# Patient Record
Sex: MALE | Race: WHITE | NOT HISPANIC OR LATINO | Employment: FULL TIME | URBAN - METROPOLITAN AREA
[De-identification: names, ages, dates, MRNs, and addresses within clinical notes are randomized per-mention and may not be internally consistent; named-entity substitution may affect disease eponyms.]

---

## 2019-01-18 ENCOUNTER — OCCUPATIONAL HEALTH (OUTPATIENT)
Dept: URGENT CARE | Facility: CLINIC | Age: 34
End: 2019-01-18
Payer: MEDICAID

## 2019-01-18 DIAGNOSIS — Z00.00 ENCOUNTER FOR PHYSICAL EXAMINATION: ICD-10-CM

## 2019-01-18 PROCEDURE — 72100 XR LUMBAR SPINE 2 OR 3 VIEWS: ICD-10-PCS | Mod: FY,S$GLB,, | Performed by: RADIOLOGY

## 2019-01-18 PROCEDURE — 72100 X-RAY EXAM L-S SPINE 2/3 VWS: CPT | Mod: FY,S$GLB,, | Performed by: RADIOLOGY

## 2019-01-18 PROCEDURE — 99499 PR PHYSICAL - BASIC NON DOT/CDL: ICD-10-PCS | Mod: S$GLB,,, | Performed by: PHYSICIAN ASSISTANT

## 2019-01-18 PROCEDURE — 99499 UNLISTED E&M SERVICE: CPT | Mod: S$GLB,,, | Performed by: PHYSICIAN ASSISTANT

## 2019-02-01 ENCOUNTER — HOSPITAL ENCOUNTER (EMERGENCY)
Facility: HOSPITAL | Age: 34
Discharge: HOME | End: 2019-02-01
Attending: EMERGENCY MEDICINE
Payer: MEDICAID

## 2019-02-01 DIAGNOSIS — S39.012A STRAIN OF LUMBAR REGION, INITIAL ENCOUNTER: Primary | ICD-10-CM

## 2019-02-01 PROCEDURE — 96372 THER/PROPH/DIAG INJ SC/IM: CPT

## 2019-02-01 PROCEDURE — 63600175 PHARM REV CODE 636 W HCPCS: Performed by: EMERGENCY MEDICINE

## 2019-02-01 PROCEDURE — 99284 EMERGENCY DEPT VISIT MOD MDM: CPT | Mod: 25

## 2019-02-01 PROCEDURE — 25000003 PHARM REV CODE 250: Performed by: EMERGENCY MEDICINE

## 2019-02-01 RX ORDER — CYCLOBENZAPRINE HCL 10 MG
10 TABLET ORAL 3 TIMES DAILY PRN
Qty: 15 TABLET | Refills: 0 | Status: SHIPPED | OUTPATIENT
Start: 2019-02-01 | End: 2019-02-06

## 2019-02-01 RX ORDER — KETOROLAC TROMETHAMINE 10 MG/1
10 TABLET, FILM COATED ORAL
Status: COMPLETED | OUTPATIENT
Start: 2019-02-01 | End: 2019-02-01

## 2019-02-01 RX ORDER — TRAMADOL HYDROCHLORIDE 50 MG/1
50 TABLET ORAL EVERY 6 HOURS PRN
Qty: 15 TABLET | Refills: 0 | Status: SHIPPED | OUTPATIENT
Start: 2019-02-01 | End: 2019-02-11

## 2019-02-01 RX ORDER — METHYLPREDNISOLONE SOD SUCC 125 MG
125 VIAL (EA) INJECTION
Status: COMPLETED | OUTPATIENT
Start: 2019-02-01 | End: 2019-02-01

## 2019-02-01 RX ADMIN — METHYLPREDNISOLONE SODIUM SUCCINATE 125 MG: 125 INJECTION, POWDER, FOR SOLUTION INTRAMUSCULAR; INTRAVENOUS at 11:02

## 2019-02-01 RX ADMIN — KETOROLAC TROMETHAMINE 10 MG: 10 TABLET, FILM COATED ORAL at 11:02

## 2019-02-02 VITALS
SYSTOLIC BLOOD PRESSURE: 140 MMHG | RESPIRATION RATE: 18 BRPM | BODY MASS INDEX: 32.98 KG/M2 | WEIGHT: 250 LBS | TEMPERATURE: 96 F | HEART RATE: 69 BPM | OXYGEN SATURATION: 99 % | DIASTOLIC BLOOD PRESSURE: 94 MMHG

## 2019-02-02 NOTE — ED PROVIDER NOTES
Encounter Date: 2/1/2019       History     Chief Complaint   Patient presents with    Back Pain     Patient states a history of lower back pain.  States having MRI done 3 years ago      The history is provided by the patient.   Back Pain    This is a chronic problem. The current episode started several weeks ago. The problem has been waxing and waning. The pain is associated with no known injury. The pain is present in the lumbar spine. The quality of the pain is described as aching. The pain does not radiate. The pain is at a severity of 4/10. The symptoms are aggravated by certain positions. Pertinent negatives include no chest pain, no fever, no numbness, no headaches, no abdominal pain, no abdominal swelling, no dysuria, no leg pain, no paresthesias, no paresis and no tingling. He has tried nothing for the symptoms.     Review of patient's allergies indicates:  No Known Allergies  History reviewed. No pertinent past medical history.  Past Surgical History:   Procedure Laterality Date    HERNIA REPAIR       History reviewed. No pertinent family history.  Social History     Tobacco Use    Smoking status: Current Every Day Smoker     Packs/day: 1.00     Years: 21.00     Pack years: 21.00     Types: Cigarettes    Smokeless tobacco: Never Used   Substance Use Topics    Alcohol use: No    Drug use: No     Review of Systems   Constitutional: Negative for fever.   HENT: Negative for ear discharge and ear pain.    Eyes: Negative for pain and itching.   Respiratory: Negative for cough.    Cardiovascular: Negative for chest pain.   Gastrointestinal: Negative for abdominal pain.   Genitourinary: Negative for dysuria and enuresis.   Musculoskeletal: Positive for back pain. Negative for neck pain and neck stiffness.   Skin: Negative for rash.   Neurological: Negative for tingling, numbness, headaches and paresthesias.       Physical Exam     Initial Vitals [02/01/19 2251]   BP Pulse Resp Temp SpO2   (!) 143/96 72 20 96  °F (35.6 °C) 99 %      MAP       --         Physical Exam    Nursing note and vitals reviewed.  Constitutional: He appears well-developed and well-nourished. He is not diaphoretic. No distress.   HENT:   Head: Normocephalic and atraumatic.   Eyes: EOM are normal. Pupils are equal, round, and reactive to light.   Neck: Normal range of motion. Neck supple. No JVD present.   Pulmonary/Chest: Breath sounds normal. No stridor. No respiratory distress. He has no wheezes. He has no rhonchi. He has no rales.   Abdominal: Soft. Bowel sounds are normal. He exhibits no distension. There is no tenderness. There is no rebound and no guarding.   Musculoskeletal: He exhibits tenderness.        Arms:  Neurological: He is alert and oriented to person, place, and time. No sensory deficit.         ED Course   Procedures  Labs Reviewed - No data to display       Imaging Results    None                               Clinical Impression:   The encounter diagnosis was Strain of lumbar region, initial encounter.      Disposition:   Disposition: Discharged  Condition: Stable                        Ramirez Jorge MD  02/01/19 [id number]

## 2019-02-02 NOTE — ED NOTES
Discharged to home/self care.    - Condition at discharge: Good  - Mode of Discharge: wheelchair  - The patient left the ED by himself  - The discharge instructions were discussed with the patient.  - He stated an understanding of the discharge instructions.  - Walked pt to the discharge station.

## 2019-02-02 NOTE — ED TRIAGE NOTES
33 y.o. male presents to ER ED 03 /ED 03B   Chief Complaint   Patient presents with    Back Pain   Pt reports low back pain for 7 years, reports it is getting worse. No acute distress noted.

## 2020-01-20 ENCOUNTER — OCCUPATIONAL HEALTH (OUTPATIENT)
Dept: URGENT CARE | Facility: CLINIC | Age: 35
End: 2020-01-20

## 2020-01-20 DIAGNOSIS — Z02.89 ENCOUNTER FOR PHYSICAL EXAMINATION RELATED TO EMPLOYMENT: Primary | ICD-10-CM

## 2020-01-20 PROCEDURE — 99499 UNLISTED E&M SERVICE: CPT | Mod: S$GLB,,, | Performed by: NURSE PRACTITIONER

## 2020-01-20 PROCEDURE — 72110 XR LUMBAR SPINE COMPLETE 5 VIEW: ICD-10-PCS | Mod: FY,TIER,S$GLB, | Performed by: RADIOLOGY

## 2020-01-20 PROCEDURE — 71045 X-RAY EXAM CHEST 1 VIEW: CPT | Mod: FY,TIER,S$GLB, | Performed by: RADIOLOGY

## 2020-01-20 PROCEDURE — 99002 DEVICE HANDLING PHYS/QHP: CPT | Mod: S$GLB,,, | Performed by: NURSE PRACTITIONER

## 2020-01-20 PROCEDURE — 99002 N95 MASK FIT: ICD-10-PCS | Mod: S$GLB,,, | Performed by: NURSE PRACTITIONER

## 2020-01-20 PROCEDURE — 99080 OSHA QUESTIONNAIRE: ICD-10-PCS | Mod: S$GLB,,, | Performed by: NURSE PRACTITIONER

## 2020-01-20 PROCEDURE — 72110 X-RAY EXAM L-2 SPINE 4/>VWS: CPT | Mod: FY,TIER,S$GLB, | Performed by: RADIOLOGY

## 2020-01-20 PROCEDURE — 89240 UNLISTED MISC PATH TEST: CPT | Mod: S$GLB,,, | Performed by: NURSE PRACTITIONER

## 2020-01-20 PROCEDURE — 97750 STRENGTH & FLEX: ICD-10-PCS | Mod: S$GLB,,, | Performed by: NURSE PRACTITIONER

## 2020-01-20 PROCEDURE — 97750 PHYSICAL PERFORMANCE TEST: CPT | Mod: S$GLB,,, | Performed by: NURSE PRACTITIONER

## 2020-01-20 PROCEDURE — 99080 SPECIAL REPORTS OR FORMS: CPT | Mod: S$GLB,,, | Performed by: NURSE PRACTITIONER

## 2020-01-20 PROCEDURE — 89240 OOH PANEL 2 (CMP, CBC W/DIFF, UA, MICR): ICD-10-PCS | Mod: S$GLB,,, | Performed by: NURSE PRACTITIONER

## 2020-01-20 PROCEDURE — 94010 PULMONARY FUNCTION SCREENING (OCC MED PHYSICALS): ICD-10-PCS | Mod: S$GLB,,, | Performed by: NURSE PRACTITIONER

## 2020-01-20 PROCEDURE — 99499 PHYSICAL, BASIC COMPLEXITY: ICD-10-PCS | Mod: S$GLB,,, | Performed by: NURSE PRACTITIONER

## 2020-01-20 PROCEDURE — 71045 XR CHEST 1 VIEW: ICD-10-PCS | Mod: FY,TIER,S$GLB, | Performed by: RADIOLOGY

## 2020-01-20 PROCEDURE — 94010 BREATHING CAPACITY TEST: CPT | Mod: S$GLB,,, | Performed by: NURSE PRACTITIONER

## 2020-01-21 LAB
ALBUMIN SERPL-MCNC: 4.7 G/DL (ref 4–5)
ALBUMIN/GLOB SERPL: 2 {RATIO} (ref 1.2–2.2)
ALP SERPL-CCNC: 76 IU/L (ref 39–117)
ALT SERPL-CCNC: 34 IU/L (ref 0–44)
APPEARANCE UR: CLEAR
AST SERPL-CCNC: 20 IU/L (ref 0–40)
BASOPHILS # BLD AUTO: 0 X10E3/UL (ref 0–0.2)
BASOPHILS NFR BLD AUTO: 0 %
BILIRUB SERPL-MCNC: 0.3 MG/DL (ref 0–1.2)
BILIRUB UR QL STRIP: NEGATIVE
BUN SERPL-MCNC: 13 MG/DL (ref 6–20)
BUN/CREAT SERPL: 12 (ref 9–20)
CALCIUM SERPL-MCNC: 9.6 MG/DL (ref 8.7–10.2)
CHLORIDE SERPL-SCNC: 103 MMOL/L (ref 96–106)
CHOLEST SERPL-MCNC: 201 MG/DL (ref 100–199)
CO2 SERPL-SCNC: 22 MMOL/L (ref 20–29)
COLOR UR: YELLOW
CREAT SERPL-MCNC: 1.08 MG/DL (ref 0.76–1.27)
EOSINOPHIL # BLD AUTO: 0.3 X10E3/UL (ref 0–0.4)
EOSINOPHIL NFR BLD AUTO: 3 %
ERYTHROCYTE [DISTWIDTH] IN BLOOD BY AUTOMATED COUNT: 13.8 % (ref 11.6–15.4)
GGT SERPL-CCNC: 47 IU/L (ref 0–65)
GLOBULIN SER CALC-MCNC: 2.4 G/DL (ref 1.5–4.5)
GLUCOSE SERPL-MCNC: 89 MG/DL (ref 65–99)
GLUCOSE UR QL: NEGATIVE
HCT VFR BLD AUTO: 46.7 % (ref 37.5–51)
HDLC SERPL-MCNC: 54 MG/DL
HGB BLD-MCNC: 16.2 G/DL (ref 13–17.7)
HGB UR QL STRIP: NEGATIVE
IMM GRANULOCYTES # BLD AUTO: 0 X10E3/UL (ref 0–0.1)
IMM GRANULOCYTES NFR BLD AUTO: 0 %
KETONES UR QL STRIP: NEGATIVE
LDLC SERPL CALC-MCNC: 107 MG/DL (ref 0–99)
LEUKOCYTE ESTERASE UR QL STRIP: NEGATIVE
LYMPHOCYTES # BLD AUTO: 2.7 X10E3/UL (ref 0.7–3.1)
LYMPHOCYTES NFR BLD AUTO: 28 %
MCH RBC QN AUTO: 30.4 PG (ref 26.6–33)
MCHC RBC AUTO-ENTMCNC: 34.7 G/DL (ref 31.5–35.7)
MCV RBC AUTO: 88 FL (ref 79–97)
MICRO URNS: NORMAL
MONOCYTES # BLD AUTO: 0.6 X10E3/UL (ref 0.1–0.9)
MONOCYTES NFR BLD AUTO: 6 %
NEUTROPHILS # BLD AUTO: 6 X10E3/UL (ref 1.4–7)
NEUTROPHILS NFR BLD AUTO: 63 %
NITRITE UR QL STRIP: NEGATIVE
PH UR STRIP: 5.5 [PH] (ref 5–7.5)
PLATELET # BLD AUTO: 172 X10E3/UL (ref 150–450)
POTASSIUM SERPL-SCNC: 4.2 MMOL/L (ref 3.5–5.2)
PROT SERPL-MCNC: 7.1 G/DL (ref 6–8.5)
PROT UR QL STRIP: NEGATIVE
RBC # BLD AUTO: 5.33 X10E6/UL (ref 4.14–5.8)
SODIUM SERPL-SCNC: 144 MMOL/L (ref 134–144)
SP GR UR: 1.02 (ref 1–1.03)
TRIGL SERPL-MCNC: 201 MG/DL (ref 0–149)
UROBILINOGEN UR STRIP-MCNC: 0.2 MG/DL (ref 0.2–1)
VLDLC SERPL CALC-MCNC: 40 MG/DL (ref 5–40)
WBC # BLD AUTO: 9.6 X10E3/UL (ref 3.4–10.8)

## 2021-08-25 PROBLEM — M51.16 LUMBAR DISC HERNIATION WITH RADICULOPATHY: Status: ACTIVE | Noted: 2021-08-25

## 2021-08-25 PROBLEM — M48.061 LUMBAR FORAMINAL STENOSIS: Status: ACTIVE | Noted: 2021-08-25

## 2021-08-26 PROBLEM — M51.26 LUMBAR DISC HERNIATION: Status: ACTIVE | Noted: 2021-08-26

## 2021-11-29 PROBLEM — M51.36 DEGENERATIVE DISC DISEASE, LUMBAR: Status: ACTIVE | Noted: 2021-11-29

## 2023-02-25 ENCOUNTER — NURSE TRIAGE (OUTPATIENT)
Dept: ADMINISTRATIVE | Facility: CLINIC | Age: 38
End: 2023-02-25
Payer: MEDICAID

## 2023-02-26 NOTE — TELEPHONE ENCOUNTER
Was seen in the ED on yesterday. Was noted to have a blood clot to the back of his leg. Was given a blood thinner and the pain has worsened. Was advised to take blood thinner and to follow up with MD.   Reason for Disposition   Entire foot is cool or blue in comparison to other side    Additional Information   Negative: Looks like a broken bone or dislocated joint (e.g., crooked or deformed)   Negative: Sounds like a life-threatening emergency to the triager   Negative: Followed a leg injury   Negative: Leg swelling is main symptom   Negative: Back pain radiating (shooting) into leg(s)   Negative: Knee pain is main symptom   Negative: Ankle pain is main symptom   Negative: Pregnant   Negative: Postpartum (from 0 to 6 weeks after delivery)   Negative: Chest pain   Negative: Difficulty breathing    Protocols used: Leg Pain-A-AH

## 2023-07-27 ENCOUNTER — HOSPITAL ENCOUNTER (EMERGENCY)
Facility: HOSPITAL | Age: 38
Discharge: LEFT BEFORE DISCHARGE | End: 2023-07-27
Attending: SURGERY
Payer: MEDICAID

## 2023-07-27 VITALS
OXYGEN SATURATION: 95 % | WEIGHT: 300 LBS | TEMPERATURE: 98 F | SYSTOLIC BLOOD PRESSURE: 188 MMHG | DIASTOLIC BLOOD PRESSURE: 95 MMHG | HEIGHT: 73 IN | RESPIRATION RATE: 16 BRPM | HEART RATE: 81 BPM | BODY MASS INDEX: 39.76 KG/M2

## 2023-07-27 DIAGNOSIS — R00.2 PALPITATIONS: ICD-10-CM

## 2023-07-27 DIAGNOSIS — R20.0 LEG NUMBNESS: ICD-10-CM

## 2023-07-27 DIAGNOSIS — Z53.29 LEFT AGAINST MEDICAL ADVICE: Primary | ICD-10-CM

## 2023-07-27 LAB
ALBUMIN SERPL BCP-MCNC: 4.3 G/DL (ref 3.5–5.2)
ALP SERPL-CCNC: 85 U/L (ref 55–135)
ALT SERPL W/O P-5'-P-CCNC: 34 U/L (ref 10–44)
AMPHET+METHAMPHET UR QL: NEGATIVE
ANION GAP SERPL CALC-SCNC: 14 MMOL/L (ref 8–16)
AST SERPL-CCNC: 20 U/L (ref 10–40)
BARBITURATES UR QL SCN>200 NG/ML: NEGATIVE
BASOPHILS # BLD AUTO: 0.08 K/UL (ref 0–0.2)
BASOPHILS NFR BLD: 0.6 % (ref 0–1.9)
BENZODIAZ UR QL SCN>200 NG/ML: NEGATIVE
BILIRUB SERPL-MCNC: 0.5 MG/DL (ref 0.1–1)
BILIRUB UR QL STRIP: NEGATIVE
BNP SERPL-MCNC: 11 PG/ML (ref 0–99)
BUN SERPL-MCNC: 18 MG/DL (ref 6–20)
BZE UR QL SCN: NEGATIVE
CALCIUM SERPL-MCNC: 10 MG/DL (ref 8.7–10.5)
CANNABINOIDS UR QL SCN: ABNORMAL
CHLORIDE SERPL-SCNC: 104 MMOL/L (ref 95–110)
CK SERPL-CCNC: 83 U/L (ref 20–200)
CLARITY UR: CLEAR
CO2 SERPL-SCNC: 20 MMOL/L (ref 23–29)
COLOR UR: YELLOW
CREAT SERPL-MCNC: 1.6 MG/DL (ref 0.5–1.4)
CREAT UR-MCNC: 298.3 MG/DL (ref 23–375)
DIFFERENTIAL METHOD: ABNORMAL
EOSINOPHIL # BLD AUTO: 0.3 K/UL (ref 0–0.5)
EOSINOPHIL NFR BLD: 2.1 % (ref 0–8)
ERYTHROCYTE [DISTWIDTH] IN BLOOD BY AUTOMATED COUNT: 13.5 % (ref 11.5–14.5)
EST. GFR  (NO RACE VARIABLE): 56 ML/MIN/1.73 M^2
GLUCOSE SERPL-MCNC: 153 MG/DL (ref 70–110)
GLUCOSE UR QL STRIP: NEGATIVE
HCT VFR BLD AUTO: 45.6 % (ref 40–54)
HGB BLD-MCNC: 15.3 G/DL (ref 14–18)
HGB UR QL STRIP: NEGATIVE
IMM GRANULOCYTES # BLD AUTO: 0.05 K/UL (ref 0–0.04)
IMM GRANULOCYTES NFR BLD AUTO: 0.4 % (ref 0–0.5)
KETONES UR QL STRIP: NEGATIVE
LEUKOCYTE ESTERASE UR QL STRIP: NEGATIVE
LYMPHOCYTES # BLD AUTO: 4.2 K/UL (ref 1–4.8)
LYMPHOCYTES NFR BLD: 31.2 % (ref 18–48)
MAGNESIUM SERPL-MCNC: 1.9 MG/DL (ref 1.6–2.6)
MCH RBC QN AUTO: 27.9 PG (ref 27–31)
MCHC RBC AUTO-ENTMCNC: 33.6 G/DL (ref 32–36)
MCV RBC AUTO: 83 FL (ref 82–98)
METHADONE UR QL SCN>300 NG/ML: NEGATIVE
MONOCYTES # BLD AUTO: 1.1 K/UL (ref 0.3–1)
MONOCYTES NFR BLD: 8.2 % (ref 4–15)
NEUTROPHILS # BLD AUTO: 7.8 K/UL (ref 1.8–7.7)
NEUTROPHILS NFR BLD: 57.5 % (ref 38–73)
NITRITE UR QL STRIP: NEGATIVE
NRBC BLD-RTO: 0 /100 WBC
OPIATES UR QL SCN: ABNORMAL
PCP UR QL SCN>25 NG/ML: NEGATIVE
PH UR STRIP: 6 [PH] (ref 5–8)
PHOSPHATE SERPL-MCNC: 3 MG/DL (ref 2.7–4.5)
PLATELET # BLD AUTO: 245 K/UL (ref 150–450)
PMV BLD AUTO: 10.2 FL (ref 9.2–12.9)
POTASSIUM SERPL-SCNC: 3.2 MMOL/L (ref 3.5–5.1)
PROT SERPL-MCNC: 7.7 G/DL (ref 6–8.4)
PROT UR QL STRIP: NEGATIVE
RBC # BLD AUTO: 5.48 M/UL (ref 4.6–6.2)
SODIUM SERPL-SCNC: 138 MMOL/L (ref 136–145)
SP GR UR STRIP: >=1.03 (ref 1–1.03)
TOXICOLOGY INFORMATION: ABNORMAL
TROPONIN I SERPL DL<=0.01 NG/ML-MCNC: <0.006 NG/ML (ref 0–0.03)
TSH SERPL DL<=0.005 MIU/L-ACNC: 2.01 UIU/ML (ref 0.4–4)
URN SPEC COLLECT METH UR: ABNORMAL
UROBILINOGEN UR STRIP-ACNC: NEGATIVE EU/DL
WBC # BLD AUTO: 13.51 K/UL (ref 3.9–12.7)

## 2023-07-27 PROCEDURE — 99900031 HC PATIENT EDUCATION (STAT)

## 2023-07-27 PROCEDURE — 93010 EKG 12-LEAD: ICD-10-PCS | Mod: ,,, | Performed by: INTERNAL MEDICINE

## 2023-07-27 PROCEDURE — 82550 ASSAY OF CK (CPK): CPT | Performed by: SURGERY

## 2023-07-27 PROCEDURE — 99285 EMERGENCY DEPT VISIT HI MDM: CPT | Mod: 25

## 2023-07-27 PROCEDURE — 93005 ELECTROCARDIOGRAM TRACING: CPT

## 2023-07-27 PROCEDURE — 80307 DRUG TEST PRSMV CHEM ANLYZR: CPT | Performed by: SURGERY

## 2023-07-27 PROCEDURE — 80053 COMPREHEN METABOLIC PANEL: CPT | Performed by: SURGERY

## 2023-07-27 PROCEDURE — 83880 ASSAY OF NATRIURETIC PEPTIDE: CPT | Performed by: SURGERY

## 2023-07-27 PROCEDURE — 84100 ASSAY OF PHOSPHORUS: CPT | Performed by: SURGERY

## 2023-07-27 PROCEDURE — 99900035 HC TECH TIME PER 15 MIN (STAT)

## 2023-07-27 PROCEDURE — 84484 ASSAY OF TROPONIN QUANT: CPT | Performed by: SURGERY

## 2023-07-27 PROCEDURE — 84443 ASSAY THYROID STIM HORMONE: CPT | Performed by: SURGERY

## 2023-07-27 PROCEDURE — 81003 URINALYSIS AUTO W/O SCOPE: CPT | Mod: 59 | Performed by: SURGERY

## 2023-07-27 PROCEDURE — 36415 COLL VENOUS BLD VENIPUNCTURE: CPT | Performed by: SURGERY

## 2023-07-27 PROCEDURE — 93010 ELECTROCARDIOGRAM REPORT: CPT | Mod: ,,, | Performed by: INTERNAL MEDICINE

## 2023-07-27 PROCEDURE — 85025 COMPLETE CBC W/AUTO DIFF WBC: CPT | Performed by: SURGERY

## 2023-07-27 PROCEDURE — 83735 ASSAY OF MAGNESIUM: CPT | Performed by: SURGERY

## 2023-07-28 NOTE — ED PROVIDER NOTES
"Encounter Date: 7/27/2023       History     Chief Complaint   Patient presents with    Pain     Patient to ER CC of abd pain, numbness and tingling to the right side of his body for about 3 days but getting worse      Hugh Sorenson III is a 38 y.o. male that presents with a large constellation of symptoms  Patient is inconsistent with the story, he states right-sided numbness for the last 3 days now  Patient states this right-sided numbness is associated with lower abdominal pain this week  Patient now stating that the numbness started this afternoon acutely while driving locally  He states this right lower extremity numbness associated with numbness in genital skin  He also states that he is developed a tremor, states both hands are shaking this evening  Patient states these symptoms tonight right-sided then sometimes bilateral in leg areas  He states that he is awaiting back surgery after workman's comp injury several years ago    Review of patient's allergies indicates:  No Known Allergies  Past Medical History:   Diagnosis Date    Anxiety     Back injury     "years ago     Past Surgical History:   Procedure Laterality Date    HERNIA REPAIR      MINIMALLY INVASIVE SURGICAL REMOVAL OF INTERVERTEBRAL DISC OF SPINE USING MICROSCOPE N/A 8/26/2021    Procedure: DISCECTOMY, SPINE, MINIMALLY INVASIVE, USING MICROSCOPE; L4-5; 3hrs duration; C-arm; [facility]; Jelani flat with tabitha frame;;  Surgeon: Sage Burgess MD;  Location: [facility] MAIN OR;  Service: Neurosurgery;  Laterality: N/A;     Family History   Problem Relation Age of Onset    No Known Problems Mother     No Known Problems Father     No Known Problems Sister     No Known Problems Brother     No Known Problems Maternal Aunt     No Known Problems Maternal Uncle     No Known Problems Paternal Aunt     No Known Problems Paternal Uncle     No Known Problems Maternal Grandmother     No Known Problems Maternal Grandfather     No Known Problems Paternal " Grandmother     No Known Problems Paternal Grandfather     Aneurysm Neg Hx     Cancer Neg Hx     Clotting disorder Neg Hx     Dementia Neg Hx     Diabetes Neg Hx     Fainting Neg Hx     Heart disease Neg Hx     Hyperlipidemia Neg Hx     Kidney disease Neg Hx     Liver disease Neg Hx     Migraines Neg Hx     Neuropathy Neg Hx     Obesity Neg Hx     Parkinsonism Neg Hx     Seizures Neg Hx     Stroke Neg Hx     Tremor Neg Hx      Social History     Tobacco Use    Smoking status: Every Day     Packs/day: 1.00     Years: 21.00     Pack years: 21.00     Types: Cigarettes, Vaping with nicotine    Smokeless tobacco: Never   Substance Use Topics    Alcohol use: No    Drug use: No     Review of Systems   Constitutional:  Positive for fatigue. Negative for diaphoresis and fever.   HENT:  Negative for ear pain, hearing loss and tinnitus.    Eyes:  Negative for pain and redness.   Respiratory:  Negative for cough, shortness of breath and wheezing.    Cardiovascular:  Negative for chest pain.   Gastrointestinal:  Positive for nausea. Negative for abdominal pain, constipation, diarrhea and rectal pain.   Musculoskeletal:  Positive for back pain. Negative for neck pain and neck stiffness.   Neurological:  Positive for dizziness, weakness and numbness. Negative for seizures and headaches.   Psychiatric/Behavioral:  Negative for confusion, decreased concentration and dysphoric mood.    All other systems reviewed and are negative.    Physical Exam     Initial Vitals [07/27/23 1912]   BP Pulse Resp Temp SpO2   (!) 188/95 (!) 113 18 97.7 °F (36.5 °C) 99 %      MAP       --         Physical Exam    Nursing note and vitals reviewed.  Constitutional: Vital signs are normal. He appears well-developed and well-nourished. He is cooperative.   HENT:   Head: Normocephalic and atraumatic.   Right Ear: Hearing, tympanic membrane, external ear and ear canal normal.   Left Ear: Hearing, tympanic membrane, external ear and ear canal normal.   Nose:  Nose normal.   Mouth/Throat: Uvula is midline, oropharynx is clear and moist and mucous membranes are normal.   Eyes: Conjunctivae, EOM and lids are normal. Pupils are equal, round, and reactive to light.   Neck: Neck supple. No JVD present.   Normal range of motion.   Full passive range of motion without pain.     Cardiovascular:  Normal rate, regular rhythm, S1 normal, S2 normal, normal heart sounds, intact distal pulses and normal pulses.           Pulmonary/Chest: Effort normal and breath sounds normal.   Abdominal: Abdomen is soft and flat. Bowel sounds are normal.   Musculoskeletal:         General: Normal range of motion.      Cervical back: Full passive range of motion without pain, normal range of motion and neck supple.     Neurological: He is alert and oriented to person, place, and time. He has normal strength.   Skin: Skin is warm, dry and intact. Capillary refill takes less than 2 seconds.       ED Course   Procedures  Labs Reviewed   COMPREHENSIVE METABOLIC PANEL - Abnormal; Notable for the following components:       Result Value    Potassium 3.2 (*)     CO2 20 (*)     Glucose 153 (*)     Creatinine 1.6 (*)     eGFR 56 (*)     All other components within normal limits   CBC W/ AUTO DIFFERENTIAL - Abnormal; Notable for the following components:    WBC 13.51 (*)     Gran # (ANC) 7.8 (*)     Immature Grans (Abs) 0.05 (*)     Mono # 1.1 (*)     All other components within normal limits   URINALYSIS, REFLEX TO URINE CULTURE - Abnormal; Notable for the following components:    Specific Gravity, UA >=1.030 (*)     All other components within normal limits    Narrative:     Specimen Source->Urine   DRUG SCREEN PANEL, URINE EMERGENCY - Abnormal; Notable for the following components:    Opiate Scrn, Ur Presumptive Positive (*)     THC Presumptive Positive (*)     All other components within normal limits    Narrative:     Specimen Source->Urine   TROPONIN I   CK   B-TYPE NATRIURETIC PEPTIDE   TSH   MAGNESIUM    PHOSPHORUS     EKG Readings: (Independently Interpreted)   No STEMI  Normal sinus rhythm  No ectopy  Normal conduction  Normal ST segments  Normal T-wave  Normal axis  Heart rate in the 80s     Imaging Results              X-Ray Chest 1 View (In process)                      CT Lumbar Spine Without Contrast (Final result)  Result time 07/27/23 20:33:11      Final result by Fernandez Frausto MD (07/27/23 20:33:11)                   Impression:      1. No acute abnormality.  2. Multilevel chronic degenerative changes.  3. Nonobstructing nephrolithiasis of the left kidney.      Electronically signed by: Fernandez Frausto  Date:    07/27/2023  Time:    20:33               Narrative:    EXAMINATION:  CT LUMBAR SPINE WITHOUT CONTRAST    CLINICAL HISTORY:  Low back pain, symptoms persist with > 6wks conservative treatment;Low back pain, no red flags, no prior management;Low back pain, increased fracture risk;    TECHNIQUE:  Low-dose axial, sagittal and coronal reformations are obtained through the lumbar spine.  Contrast was not administered.    COMPARISON:  X-ray 11/30/2021    FINDINGS:  No acute fractures of the lumbar spine.  Alignment is satisfactory.    Moderate disc space narrowing at L5-S1.    L1-2: No significant abnormality.    L2-3: No significant abnormality.    L3-4: No significant abnormality.    L4-5: Mild diffuse posterior disc osteophyte complex.  The central canal is adequately maintained.  There is severe bilateral foraminal narrowing left greater than right.    L5-S1: Mild diffuse posterior disc osteophyte complex.  Moderate-severe bilateral foraminal narrowing.  The central canal is adequately maintained.    The remaining visualized paravertebral structures demonstrate no pathology.    Nonobstructing nephrolithiasis of the left kidney is incidentally noted.  An inferior vena caval filter is noted also.                                       CT Head Without Contrast (Final result)  Result time 07/27/23  20:06:35      Final result by Fernandez Frausto MD (07/27/23 20:06:35)                   Impression:      1. No acute intracranial process.  2. Mild bilateral ethmoid sinus disease.      Electronically signed by: Fernandez Frausto  Date:    07/27/2023  Time:    20:06               Narrative:    EXAMINATION:  CT HEAD WITHOUT CONTRAST    CLINICAL HISTORY:  Dizziness, persistent/recurrent, cardiac or vascular cause suspected;    TECHNIQUE:  Low dose axial CT images obtained throughout the head without intravenous contrast. Sagittal and coronal reconstructions were performed.    COMPARISON:  None.    FINDINGS:  Intracranial compartment:    Ventricles and sulci are normal in size for age without evidence of hydrocephalus. No extra-axial blood or fluid collections.    The brain parenchyma appears normal. No parenchymal mass, hemorrhage, edema or major vascular distribution infarct.    Skull/extracranial contents (limited evaluation): No fracture. Mild bilateral ethmoid sinus disease.                                       Medical Decision Making  38-year-old with lower extremity numbness now stating this afternoon  Patient states right greater than left lower extremity numbness on triage  Patient states that he had a lower back injury/workman's comp years ago  He is currently awaiting back surgery & sees local [place] pain MD  He also has a tremor, headache, dizziness, nausea & abdominal pain    Differential Diagnosis  Cauda equina syndrome, Guillain-Powell, chronic low back pain, sciatica  Metabolic encephalopathy, dehydration, fatigue, drug use, anxiety issues  Malingering, somatic pain, stress, viral illness, large differential diagnosis    Problems Addressed:  Left against medical advice: self-limited or minor problem  Leg numbness: complicated acute illness or injury  Palpitations: complicated acute illness or injury    Amount and/or Complexity of Data Reviewed  Independent Historian: spouse  External Data Reviewed:  notes.  Labs: ordered. Decision-making details documented in ED Course.  Radiology: ordered and independent interpretation performed.  ECG/medicine tests: ordered and independent interpretation performed.    ED Management & Risk of Complications, Morbidity, Mortality:  This patient has a large constellation of symptoms on presentation today  Patient states his right leg is numb & that is general areas numb now  He is not being consistent on the timing of this numbness on interview  He initially said 3 days ago now he stating this happened while driving    Patient also states he is nauseated with lower abdominal pain & headache  Pt states he has been dealing with issues since his back injury years ago  Patient is frustrated that he is currently awaiting back surgery in [place]    All imaging in the emergency room was nonacute, nonacute lab work noted  Mild leukocytosis & (+) opiates & marijuana on the drug screen this evening  On my physical exam I see no obvious motor deficits on initial assessment  I plan to transfer this patient to higher level care for neurology evaluation  Pt states he is going to drive himself & signed out against medical advice    Critical Care ED Physician Time (minutes):  -- Performed by: Karri Antoine M.D.  -- Date/Time: 9:36 PM 7/27/2023   -- Direct Patient Care (Face Time): 5  -- Additional History from Records or Taking Additional History: 5  -- Ordering, Reviewing, and Interpreting Diagnostic Studies: 5  -- Total Time in Documentation: 11  -- Consultation with Other Physicians: 5  -- Consultation with Family Related to Condition: 0  -- Total Critical Care Time: 31  -- Critical care was necessary to treat several neurologic conditions  -- Critical care was time spent personally by me on the following activities:   -- development of treatment plan with patient & their family  -- examination of patient, ordering and performing treatments   -- review of radiographic studies, re-evaluation of  pt's condition  -- review of labs and evaluation of response to treatment                                 Clinical Impression:   Final diagnoses:  [R00.2] Palpitations  [R20.0] Leg numbness  [Z53.29] Left against medical advice (Primary)        ED Disposition Condition    AMA Stable                Karri Antoine MD  07/27/23 7790

## 2023-07-28 NOTE — ED NOTES
Pt states he does not want to stay for the testing or treatments. Pt requesting to leave AMA. MD notified. MD discussed risks of leaving AMA with patient. Pt signed AMA form and ambulated from ED and reports that he will drive himself to another facility. LESLEE

## 2023-09-07 ENCOUNTER — HOSPITAL ENCOUNTER (OUTPATIENT)
Facility: HOSPITAL | Age: 38
Discharge: HOME | End: 2023-09-09
Attending: EMERGENCY MEDICINE | Admitting: INTERNAL MEDICINE
Payer: MEDICAID

## 2023-09-07 DIAGNOSIS — R07.9 CHEST PAIN: ICD-10-CM

## 2023-09-07 DIAGNOSIS — M54.50 ACUTE EXACERBATION OF CHRONIC LOW BACK PAIN: Primary | ICD-10-CM

## 2023-09-07 DIAGNOSIS — G89.29 ACUTE EXACERBATION OF CHRONIC LOW BACK PAIN: Primary | ICD-10-CM

## 2023-09-07 DIAGNOSIS — R29.898 WEAKNESS OF BOTH LEGS: ICD-10-CM

## 2023-09-07 PROBLEM — E87.20 METABOLIC ACIDOSIS: Status: ACTIVE | Noted: 2023-09-07

## 2023-09-07 PROBLEM — Z86.718 HISTORY OF DVT (DEEP VEIN THROMBOSIS): Status: ACTIVE | Noted: 2023-09-07

## 2023-09-07 PROBLEM — Z95.828 S/P INSERTION OF IVC (INFERIOR VENA CAVAL) FILTER: Status: ACTIVE | Noted: 2023-09-07

## 2023-09-07 PROBLEM — E66.9 CLASS 2 OBESITY WITH BODY MASS INDEX (BMI) OF 39.0 TO 39.9 IN ADULT: Status: ACTIVE | Noted: 2023-09-07

## 2023-09-07 LAB
ALBUMIN SERPL BCP-MCNC: 4 G/DL (ref 3.5–5.2)
ALP SERPL-CCNC: 76 U/L (ref 55–135)
ALT SERPL W/O P-5'-P-CCNC: 19 U/L (ref 10–44)
ANION GAP SERPL CALC-SCNC: 9 MMOL/L (ref 8–16)
APTT PPP: 22.8 SEC (ref 21–32)
AST SERPL-CCNC: 14 U/L (ref 10–40)
BASOPHILS # BLD AUTO: 0.04 K/UL (ref 0–0.2)
BASOPHILS NFR BLD: 0.4 % (ref 0–1.9)
BILIRUB SERPL-MCNC: 0.4 MG/DL (ref 0.1–1)
BILIRUB UR QL STRIP: NEGATIVE
BUN SERPL-MCNC: 19 MG/DL (ref 6–20)
CALCIUM SERPL-MCNC: 9.5 MG/DL (ref 8.7–10.5)
CHLORIDE SERPL-SCNC: 111 MMOL/L (ref 95–110)
CK SERPL-CCNC: 73 U/L (ref 20–200)
CLARITY UR REFRACT.AUTO: CLEAR
CO2 SERPL-SCNC: 19 MMOL/L (ref 23–29)
COLOR UR AUTO: YELLOW
CREAT SERPL-MCNC: 1.1 MG/DL (ref 0.5–1.4)
DIFFERENTIAL METHOD: ABNORMAL
EOSINOPHIL # BLD AUTO: 0 K/UL (ref 0–0.5)
EOSINOPHIL NFR BLD: 0.2 % (ref 0–8)
ERYTHROCYTE [DISTWIDTH] IN BLOOD BY AUTOMATED COUNT: 13.1 % (ref 11.5–14.5)
EST. GFR  (NO RACE VARIABLE): >60 ML/MIN/1.73 M^2
GLUCOSE SERPL-MCNC: 124 MG/DL (ref 70–110)
GLUCOSE UR QL STRIP: NEGATIVE
HCT VFR BLD AUTO: 44 % (ref 40–54)
HCV AB SERPL QL IA: NORMAL
HGB BLD-MCNC: 14.9 G/DL (ref 14–18)
HGB UR QL STRIP: NEGATIVE
HIV 1+2 AB+HIV1 P24 AG SERPL QL IA: NORMAL
IMM GRANULOCYTES # BLD AUTO: 0.06 K/UL (ref 0–0.04)
IMM GRANULOCYTES NFR BLD AUTO: 0.6 % (ref 0–0.5)
INR PPP: 1 (ref 0.8–1.2)
KETONES UR QL STRIP: NEGATIVE
LEUKOCYTE ESTERASE UR QL STRIP: NEGATIVE
LIPASE SERPL-CCNC: 20 U/L (ref 4–60)
LYMPHOCYTES # BLD AUTO: 1.1 K/UL (ref 1–4.8)
LYMPHOCYTES NFR BLD: 9.9 % (ref 18–48)
MCH RBC QN AUTO: 28.9 PG (ref 27–31)
MCHC RBC AUTO-ENTMCNC: 33.9 G/DL (ref 32–36)
MCV RBC AUTO: 85 FL (ref 82–98)
MONOCYTES # BLD AUTO: 0.1 K/UL (ref 0.3–1)
MONOCYTES NFR BLD: 1.2 % (ref 4–15)
NEUTROPHILS # BLD AUTO: 9.5 K/UL (ref 1.8–7.7)
NEUTROPHILS NFR BLD: 87.7 % (ref 38–73)
NITRITE UR QL STRIP: NEGATIVE
NRBC BLD-RTO: 0 /100 WBC
PH UR STRIP: 5 [PH] (ref 5–8)
PLATELET # BLD AUTO: 200 K/UL (ref 150–450)
PMV BLD AUTO: 11 FL (ref 9.2–12.9)
POCT GLUCOSE: 134 MG/DL (ref 70–110)
POTASSIUM SERPL-SCNC: 4.4 MMOL/L (ref 3.5–5.1)
PROT SERPL-MCNC: 7.2 G/DL (ref 6–8.4)
PROT UR QL STRIP: NEGATIVE
PROTHROMBIN TIME: 11.1 SEC (ref 9–12.5)
RBC # BLD AUTO: 5.15 M/UL (ref 4.6–6.2)
SODIUM SERPL-SCNC: 139 MMOL/L (ref 136–145)
SP GR UR STRIP: >=1.03 (ref 1–1.03)
URN SPEC COLLECT METH UR: ABNORMAL
WBC # BLD AUTO: 10.82 K/UL (ref 3.9–12.7)

## 2023-09-07 PROCEDURE — 25000003 PHARM REV CODE 250

## 2023-09-07 PROCEDURE — 99203 PR OFFICE/OUTPT VISIT, NEW, LEVL III, 30-44 MIN: ICD-10-PCS | Mod: ,,, | Performed by: NEUROLOGICAL SURGERY

## 2023-09-07 PROCEDURE — A9585 GADOBUTROL INJECTION: HCPCS | Performed by: EMERGENCY MEDICINE

## 2023-09-07 PROCEDURE — 99222 1ST HOSP IP/OBS MODERATE 55: CPT | Mod: ,,,

## 2023-09-07 PROCEDURE — 63600175 PHARM REV CODE 636 W HCPCS

## 2023-09-07 PROCEDURE — 25500020 PHARM REV CODE 255: Performed by: EMERGENCY MEDICINE

## 2023-09-07 PROCEDURE — 87389 HIV-1 AG W/HIV-1&-2 AB AG IA: CPT | Performed by: PHYSICIAN ASSISTANT

## 2023-09-07 PROCEDURE — 82550 ASSAY OF CK (CPK): CPT

## 2023-09-07 PROCEDURE — G0378 HOSPITAL OBSERVATION PER HR: HCPCS

## 2023-09-07 PROCEDURE — 85610 PROTHROMBIN TIME: CPT

## 2023-09-07 PROCEDURE — 96372 THER/PROPH/DIAG INJ SC/IM: CPT | Mod: 59

## 2023-09-07 PROCEDURE — 96374 THER/PROPH/DIAG INJ IV PUSH: CPT

## 2023-09-07 PROCEDURE — 99285 EMERGENCY DEPT VISIT HI MDM: CPT | Mod: 25

## 2023-09-07 PROCEDURE — 82962 GLUCOSE BLOOD TEST: CPT

## 2023-09-07 PROCEDURE — 80053 COMPREHEN METABOLIC PANEL: CPT

## 2023-09-07 PROCEDURE — 83690 ASSAY OF LIPASE: CPT

## 2023-09-07 PROCEDURE — 99203 OFFICE O/P NEW LOW 30 MIN: CPT | Mod: ,,, | Performed by: NEUROLOGICAL SURGERY

## 2023-09-07 PROCEDURE — 96376 TX/PRO/DX INJ SAME DRUG ADON: CPT

## 2023-09-07 PROCEDURE — 85730 THROMBOPLASTIN TIME PARTIAL: CPT

## 2023-09-07 PROCEDURE — 81003 URINALYSIS AUTO W/O SCOPE: CPT

## 2023-09-07 PROCEDURE — 85025 COMPLETE CBC W/AUTO DIFF WBC: CPT

## 2023-09-07 PROCEDURE — 96365 THER/PROPH/DIAG IV INF INIT: CPT

## 2023-09-07 PROCEDURE — 99222 PR INITIAL HOSPITAL CARE,LEVL II: ICD-10-PCS | Mod: ,,,

## 2023-09-07 PROCEDURE — 86803 HEPATITIS C AB TEST: CPT | Performed by: PHYSICIAN ASSISTANT

## 2023-09-07 RX ORDER — SIMETHICONE 80 MG
1 TABLET,CHEWABLE ORAL 4 TIMES DAILY PRN
Status: DISCONTINUED | OUTPATIENT
Start: 2023-09-07 | End: 2023-09-09 | Stop reason: HOSPADM

## 2023-09-07 RX ORDER — ACETAMINOPHEN 325 MG/1
650 TABLET ORAL EVERY 8 HOURS PRN
Status: DISCONTINUED | OUTPATIENT
Start: 2023-09-07 | End: 2023-09-09 | Stop reason: HOSPADM

## 2023-09-07 RX ORDER — ACETAMINOPHEN 500 MG
1000 TABLET ORAL 3 TIMES DAILY
Status: DISCONTINUED | OUTPATIENT
Start: 2023-09-07 | End: 2023-09-09 | Stop reason: HOSPADM

## 2023-09-07 RX ORDER — ONDANSETRON 8 MG/1
8 TABLET, ORALLY DISINTEGRATING ORAL EVERY 8 HOURS PRN
Status: DISCONTINUED | OUTPATIENT
Start: 2023-09-07 | End: 2023-09-09 | Stop reason: HOSPADM

## 2023-09-07 RX ORDER — MORPHINE SULFATE 4 MG/ML
4 INJECTION, SOLUTION INTRAMUSCULAR; INTRAVENOUS
Status: COMPLETED | OUTPATIENT
Start: 2023-09-07 | End: 2023-09-07

## 2023-09-07 RX ORDER — POLYETHYLENE GLYCOL 3350 17 G/17G
17 POWDER, FOR SOLUTION ORAL 2 TIMES DAILY PRN
Status: DISCONTINUED | OUTPATIENT
Start: 2023-09-07 | End: 2023-09-09 | Stop reason: HOSPADM

## 2023-09-07 RX ORDER — SODIUM CHLORIDE 0.9 % (FLUSH) 0.9 %
10 SYRINGE (ML) INJECTION
Status: DISCONTINUED | OUTPATIENT
Start: 2023-09-07 | End: 2023-09-09 | Stop reason: HOSPADM

## 2023-09-07 RX ORDER — HYDROMORPHONE HYDROCHLORIDE 1 MG/ML
2 INJECTION, SOLUTION INTRAMUSCULAR; INTRAVENOUS; SUBCUTANEOUS EVERY 6 HOURS PRN
Status: DISCONTINUED | OUTPATIENT
Start: 2023-09-07 | End: 2023-09-09 | Stop reason: HOSPADM

## 2023-09-07 RX ORDER — GABAPENTIN 300 MG/1
300 CAPSULE ORAL 3 TIMES DAILY
Status: DISCONTINUED | OUTPATIENT
Start: 2023-09-07 | End: 2023-09-09 | Stop reason: HOSPADM

## 2023-09-07 RX ORDER — GADOBUTROL 604.72 MG/ML
10 INJECTION INTRAVENOUS
Status: COMPLETED | OUTPATIENT
Start: 2023-09-07 | End: 2023-09-07

## 2023-09-07 RX ORDER — ENOXAPARIN SODIUM 100 MG/ML
40 INJECTION SUBCUTANEOUS EVERY 24 HOURS
Status: DISCONTINUED | OUTPATIENT
Start: 2023-09-07 | End: 2023-09-09 | Stop reason: HOSPADM

## 2023-09-07 RX ORDER — OXYCODONE HYDROCHLORIDE 5 MG/1
30 TABLET ORAL EVERY 6 HOURS PRN
Status: DISCONTINUED | OUTPATIENT
Start: 2023-09-07 | End: 2023-09-07

## 2023-09-07 RX ORDER — OXYCODONE HYDROCHLORIDE 10 MG/1
20 TABLET ORAL EVERY 6 HOURS PRN
Status: DISCONTINUED | OUTPATIENT
Start: 2023-09-07 | End: 2023-09-09 | Stop reason: HOSPADM

## 2023-09-07 RX ORDER — MAG HYDROX/ALUMINUM HYD/SIMETH 200-200-20
30 SUSPENSION, ORAL (FINAL DOSE FORM) ORAL 4 TIMES DAILY PRN
Status: DISCONTINUED | OUTPATIENT
Start: 2023-09-07 | End: 2023-09-09 | Stop reason: HOSPADM

## 2023-09-07 RX ORDER — IBUPROFEN 200 MG
24 TABLET ORAL
Status: DISCONTINUED | OUTPATIENT
Start: 2023-09-07 | End: 2023-09-09 | Stop reason: HOSPADM

## 2023-09-07 RX ORDER — LIDOCAINE 50 MG/G
1 PATCH TOPICAL
Status: DISCONTINUED | OUTPATIENT
Start: 2023-09-07 | End: 2023-09-09 | Stop reason: HOSPADM

## 2023-09-07 RX ORDER — ASPIRIN 81 MG/1
81 TABLET ORAL DAILY
Status: DISCONTINUED | OUTPATIENT
Start: 2023-09-08 | End: 2023-09-09 | Stop reason: HOSPADM

## 2023-09-07 RX ORDER — NALOXONE HCL 0.4 MG/ML
0.02 VIAL (ML) INJECTION
Status: DISCONTINUED | OUTPATIENT
Start: 2023-09-07 | End: 2023-09-09 | Stop reason: HOSPADM

## 2023-09-07 RX ORDER — METHOCARBAMOL 750 MG/1
750 TABLET, FILM COATED ORAL 4 TIMES DAILY
Status: DISCONTINUED | OUTPATIENT
Start: 2023-09-07 | End: 2023-09-09 | Stop reason: HOSPADM

## 2023-09-07 RX ORDER — IBUPROFEN 200 MG
16 TABLET ORAL
Status: DISCONTINUED | OUTPATIENT
Start: 2023-09-07 | End: 2023-09-09 | Stop reason: HOSPADM

## 2023-09-07 RX ORDER — ASPIRIN 81 MG/1
81 TABLET ORAL DAILY
COMMUNITY

## 2023-09-07 RX ORDER — ACETAMINOPHEN 325 MG/1
650 TABLET ORAL EVERY 4 HOURS PRN
Status: DISCONTINUED | OUTPATIENT
Start: 2023-09-07 | End: 2023-09-09 | Stop reason: HOSPADM

## 2023-09-07 RX ORDER — ONDANSETRON 2 MG/ML
4 INJECTION INTRAMUSCULAR; INTRAVENOUS EVERY 8 HOURS PRN
Status: DISCONTINUED | OUTPATIENT
Start: 2023-09-07 | End: 2023-09-09 | Stop reason: HOSPADM

## 2023-09-07 RX ORDER — TALC
6 POWDER (GRAM) TOPICAL NIGHTLY PRN
Status: DISCONTINUED | OUTPATIENT
Start: 2023-09-07 | End: 2023-09-09 | Stop reason: HOSPADM

## 2023-09-07 RX ORDER — GLUCAGON 1 MG
1 KIT INJECTION
Status: DISCONTINUED | OUTPATIENT
Start: 2023-09-07 | End: 2023-09-09 | Stop reason: HOSPADM

## 2023-09-07 RX ADMIN — MORPHINE SULFATE 4 MG: 4 INJECTION INTRAVENOUS at 11:09

## 2023-09-07 RX ADMIN — LIDOCAINE 1 PATCH: 50 PATCH CUTANEOUS at 03:09

## 2023-09-07 RX ADMIN — ENOXAPARIN SODIUM 40 MG: 40 INJECTION SUBCUTANEOUS at 04:09

## 2023-09-07 RX ADMIN — GADOBUTROL 10 ML: 604.72 INJECTION INTRAVENOUS at 10:09

## 2023-09-07 RX ADMIN — OXYCODONE HYDROCHLORIDE 20 MG: 10 TABLET ORAL at 03:09

## 2023-09-07 RX ADMIN — HYDROMORPHONE HYDROCHLORIDE 2 MG: 1 INJECTION, SOLUTION INTRAMUSCULAR; INTRAVENOUS; SUBCUTANEOUS at 09:09

## 2023-09-07 RX ADMIN — METHOCARBAMOL 750 MG: 750 TABLET ORAL at 04:09

## 2023-09-07 RX ADMIN — MORPHINE SULFATE 4 MG: 4 INJECTION INTRAVENOUS at 07:09

## 2023-09-07 RX ADMIN — ACETAMINOPHEN 1000 MG: 500 TABLET ORAL at 03:09

## 2023-09-07 RX ADMIN — GABAPENTIN 300 MG: 300 CAPSULE ORAL at 09:09

## 2023-09-07 RX ADMIN — METHOCARBAMOL 750 MG: 750 TABLET ORAL at 03:09

## 2023-09-07 RX ADMIN — GABAPENTIN 300 MG: 300 CAPSULE ORAL at 03:09

## 2023-09-07 RX ADMIN — OXYCODONE HYDROCHLORIDE 20 MG: 10 TABLET ORAL at 10:09

## 2023-09-07 RX ADMIN — ACETAMINOPHEN 1000 MG: 500 TABLET ORAL at 09:09

## 2023-09-07 RX ADMIN — IOHEXOL 100 ML: 350 INJECTION, SOLUTION INTRAVENOUS at 01:09

## 2023-09-07 NOTE — SUBJECTIVE & OBJECTIVE
"Past Medical History:   Diagnosis Date    Anxiety     Back injury     "years ago       Past Surgical History:   Procedure Laterality Date    BACK SURGERY      HERNIA REPAIR      MINIMALLY INVASIVE SURGICAL REMOVAL OF INTERVERTEBRAL DISC OF SPINE USING MICROSCOPE N/A 08/26/2021    Procedure: DISCECTOMY, SPINE, MINIMALLY INVASIVE, USING MICROSCOPE; L4-5; 3hrs duration; C-arm; [facility]; Jelani flat with tabitha frame;;  Surgeon: Sage Burgess MD;  Location: [facility] MAIN OR;  Service: Neurosurgery;  Laterality: N/A;       Social History     Socioeconomic History    Marital status:    Tobacco Use    Smoking status: Every Day     Current packs/day: 1.00     Average packs/day: 1 pack/day for 21.0 years (21.0 ttl pk-yrs)     Types: Cigarettes, Vaping with nicotine    Smokeless tobacco: Never   Substance and Sexual Activity    Alcohol use: No    Drug use: No    Sexual activity: Not Currently       Family History   Problem Relation Age of Onset    No Known Problems Mother     No Known Problems Father     No Known Problems Sister     No Known Problems Brother     No Known Problems Maternal Aunt     No Known Problems Maternal Uncle     No Known Problems Paternal Aunt     No Known Problems Paternal Uncle     No Known Problems Maternal Grandmother     No Known Problems Maternal Grandfather     No Known Problems Paternal Grandmother     No Known Problems Paternal Grandfather     Aneurysm Neg Hx     Cancer Neg Hx     Clotting disorder Neg Hx     Dementia Neg Hx     Diabetes Neg Hx     Fainting Neg Hx     Heart disease Neg Hx     Hyperlipidemia Neg Hx     Kidney disease Neg Hx     Liver disease Neg Hx     Migraines Neg Hx     Neuropathy Neg Hx     Obesity Neg Hx     Parkinsonism Neg Hx     Seizures Neg Hx     Stroke Neg Hx     Tremor Neg Hx        Review of patient's allergies indicates:  No Known Allergies      Medications:  Continuous Infusions:  Scheduled Meds:  PRN Meds:     Review of Systems  Objective:     Weight: " 136.1 kg (300 lb)  Body mass index is 39.58 kg/m².  Vital Signs (Most Recent):  Temp: 98 °F (36.7 °C) (09/07/23 1147)  Pulse: 83 (09/07/23 1232)  Resp: 18 (09/07/23 1150)  BP: (!) 102/58 (09/07/23 1232)  SpO2: 95 % (09/07/23 1248) Vital Signs (24h Range):  Temp:  [97.9 °F (36.6 °C)-98.3 °F (36.8 °C)] 98 °F (36.7 °C)  Pulse:  [61-83] 83  Resp:  [15-20] 18  SpO2:  [95 %-98 %] 95 %  BP: (102-129)/(45-79) 102/58     Date 09/07/23 0700 - 09/08/23 0659   Shift 3884-9279 1319-1043 6587-6331 24 Hour Total   INTAKE   Shift Total(mL/kg)       OUTPUT   Urine(mL/kg/hr) 400   400   Shift Total(mL/kg) 400(2.9)   400(2.9)   Weight (kg) 136.1 136.1 136.1 136.1                               Physical Exam         Neurosurgery Physical Exam    Physical Exam:    Constitutional: No distress.     HEENT: atraumatic/normocephalic    Cardiovascular: Regular rhythm.     Pulm: aerating well, saturating well    Abdominal: Soft.     Psych/Behavior: He is alert.     RUE: 5/5 delt, 5/5 bi, 5/5 tri, 5/5 hg, 5/5 io  LUE: 5/5 delt, 5/5 bi, 5/5 tri, 5/5 hg, 5/5 io  RLE: 5/5 hf, 5/5 quad, 5/5 hamstring, 5/5 df, 5/5 pf  LLE: 5/5 hf, 5/5 quad, 5/5 hamstring, 5/5 df, 5/5 pf    Effort limited strength, but can overcome in all muscle groups    SILT    No hoffmans  No clonus  No babinski      Significant Labs:  Recent Labs   Lab 09/07/23  0730   *      K 4.4   *   CO2 19*   BUN 19   CREATININE 1.1   CALCIUM 9.5     Recent Labs   Lab 09/07/23  0730   WBC 10.82   HGB 14.9   HCT 44.0        Recent Labs   Lab 09/07/23  0730   INR 1.0   APTT 22.8     Microbiology Results (last 7 days)       ** No results found for the last 168 hours. **          All pertinent labs from the last 24 hours have been reviewed.    Significant Diagnostics:  I have reviewed all pertinent imaging results/findings within the past 24 hours.

## 2023-09-07 NOTE — ASSESSMENT & PLAN NOTE
S/p insertion of IVC filter   - DVT following lumbar surgery  - completed oral anticoagulant course   - IVC filter placed   - CT AP with contrast negative for thrombosis   - DVT prophylaxis while inpatient

## 2023-09-07 NOTE — ED NOTES
Pt resting in bed with family at the bedside, denies any needs at this time, pt updated on plan of care

## 2023-09-07 NOTE — ASSESSMENT & PLAN NOTE
38 M [employer] work in injury, L4-5 herniated disc, s/p laser disc treatment to lumbar spine in the past year presents with BLE pain and weakness.     MRI L spine reviewed, no significant central canal stenosis. Foramenal narrowing at L4-5 bilaterally.     MRI L spine without findings that would explain constellation of symptoms.   No urgent surgical findings present on MRI L spine  No neurosurgical intervention warranted at this time  No indication for admission to neurosurgical service at this time  Recommend MRI T spine w/o contrast for completion of workup given lower abdomen numbness.

## 2023-09-07 NOTE — CONSULTS
"[facility] - [facility] Dept  Neurosurgery  Consult Note    Subjective:     History of Present Illness: 38 M [employer] work in injury, L4-5 herniated disc, s/p laser disc treatment to lumbar spine in the past year presents with BLE pain and weakness. He reports pain and numbness down the lateral and anterior thighs. He reports legs giving out while trying to walk, but no segmental or lateralizing weakness. He has lower abdominal numbness. He denies saddle anesthesia or bowel bladder changes. She denies upper extremity symptoms.       Post-Op Info:  * No surgery found *         Past Medical History:   Diagnosis Date    Anxiety     Back injury     "years ago       Past Surgical History:   Procedure Laterality Date    BACK SURGERY      HERNIA REPAIR      MINIMALLY INVASIVE SURGICAL REMOVAL OF INTERVERTEBRAL DISC OF SPINE USING MICROSCOPE N/A 08/26/2021    Procedure: DISCECTOMY, SPINE, MINIMALLY INVASIVE, USING MICROSCOPE; L4-5; 3hrs duration; C-arm; [facility]; Jelani flat with tabitha frame;;  Surgeon: Sage Burgess MD;  Location: [facility] MAIN OR;  Service: Neurosurgery;  Laterality: N/A;       Social History     Socioeconomic History    Marital status:    Tobacco Use    Smoking status: Every Day     Current packs/day: 1.00     Average packs/day: 1 pack/day for 21.0 years (21.0 ttl pk-yrs)     Types: Cigarettes, Vaping with nicotine    Smokeless tobacco: Never   Substance and Sexual Activity    Alcohol use: No    Drug use: No    Sexual activity: Not Currently       Family History   Problem Relation Age of Onset    No Known Problems Mother     No Known Problems Father     No Known Problems Sister     No Known Problems Brother     No Known Problems Maternal Aunt     No Known Problems Maternal Uncle     No Known Problems Paternal Aunt     No Known Problems Paternal Uncle     No Known Problems Maternal Grandmother     No Known Problems Maternal Grandfather     No Known Problems Paternal Grandmother  "    No Known Problems Paternal Grandfather     Aneurysm Neg Hx     Cancer Neg Hx     Clotting disorder Neg Hx     Dementia Neg Hx     Diabetes Neg Hx     Fainting Neg Hx     Heart disease Neg Hx     Hyperlipidemia Neg Hx     Kidney disease Neg Hx     Liver disease Neg Hx     Migraines Neg Hx     Neuropathy Neg Hx     Obesity Neg Hx     Parkinsonism Neg Hx     Seizures Neg Hx     Stroke Neg Hx     Tremor Neg Hx        Review of patient's allergies indicates:  No Known Allergies      Medications:  Continuous Infusions:  Scheduled Meds:  PRN Meds:     Review of Systems  Objective:     Weight: 136.1 kg (300 lb)  Body mass index is 39.58 kg/m².  Vital Signs (Most Recent):  Temp: 98 °F (36.7 °C) (09/07/23 1147)  Pulse: 83 (09/07/23 1232)  Resp: 18 (09/07/23 1150)  BP: (!) 102/58 (09/07/23 1232)  SpO2: 95 % (09/07/23 1248) Vital Signs (24h Range):  Temp:  [97.9 °F (36.6 °C)-98.3 °F (36.8 °C)] 98 °F (36.7 °C)  Pulse:  [61-83] 83  Resp:  [15-20] 18  SpO2:  [95 %-98 %] 95 %  BP: (102-129)/(45-79) 102/58     Date 09/07/23 0700 - 09/08/23 0659   Shift 5429-9626 1799-5420 3576-0981 24 Hour Total   INTAKE   Shift Total(mL/kg)       OUTPUT   Urine(mL/kg/hr) 400   400   Shift Total(mL/kg) 400(2.9)   400(2.9)   Weight (kg) 136.1 136.1 136.1 136.1                               Physical Exam         Neurosurgery Physical Exam    Physical Exam:    Constitutional: No distress.     HEENT: atraumatic/normocephalic    Cardiovascular: Regular rhythm.     Pulm: aerating well, saturating well    Abdominal: Soft.     Psych/Behavior: He is alert.     RUE: 5/5 delt, 5/5 bi, 5/5 tri, 5/5 hg, 5/5 io  LUE: 5/5 delt, 5/5 bi, 5/5 tri, 5/5 hg, 5/5 io  RLE: 5/5 hf, 5/5 quad, 5/5 hamstring, 5/5 df, 5/5 pf  LLE: 5/5 hf, 5/5 quad, 5/5 hamstring, 5/5 df, 5/5 pf    Effort limited strength, but can overcome in all muscle groups    SILT    No hoffmans  No clonus  No babinski      Significant Labs:  Recent Labs   Lab 09/07/23  0730   *       K 4.4   *   CO2 19*   BUN 19   CREATININE 1.1   CALCIUM 9.5     Recent Labs   Lab 09/07/23  0730   WBC 10.82   HGB 14.9   HCT 44.0        Recent Labs   Lab 09/07/23  0730   INR 1.0   APTT 22.8     Microbiology Results (last 7 days)       ** No results found for the last 168 hours. **          All pertinent labs from the last 24 hours have been reviewed.    Significant Diagnostics:  I have reviewed all pertinent imaging results/findings within the past 24 hours.    Assessment/Plan:     Leg weakness  38 M [employer] work in injury, L4-5 herniated disc, s/p laser disc treatment to lumbar spine in the past year presents with BLE pain and weakness.     MRI L spine reviewed, no significant central canal stenosis. Foramenal narrowing at L4-5 bilaterally.     MRI L spine without findings that would explain constellation of symptoms.   No urgent surgical findings present on MRI L spine  No neurosurgical intervention warranted at this time  No indication for admission to neurosurgical service at this time  Recommend MRI T spine w/o contrast for completion of workup given lower abdomen numbness.         Dinorah Villarreal MD  Neurosurgery  [facility] Dept

## 2023-09-07 NOTE — PHARMACY MED REC
"Admission Medication History     The home medication history was taken by Tobias Yadav.    You may go to "Admission" then "Reconcile Home Medications" tabs to review and/or act upon these items.     The home medication list has been updated by the Pharmacy department.   Please read ALL comments highlighted in yellow.   Please address this information as you see fit.    Feel free to contact us if you have any questions or require assistance.      The medications listed below were removed from the home medication list. Please reorder if appropriate:  Patient reports no longer taking the following medication(s):  GABAPENTIN 300 MG  NAPROXEN 500 MG    Medications listed below were obtained from: Patient  Current Outpatient Medications on File Prior to Encounter   Medication Sig    aspirin (ECOTRIN) 81 MG EC tablet   Take 81 mg by mouth once daily.    oxyCODONE (ROXICODONE) 20 mg Tab immediate release tablet   Take 1 tablet (20 mg) by mouth four times daily as needed for chronic pain                 Tobias Yadav  EXT [id number]                  .          "

## 2023-09-07 NOTE — ED NOTES
Patient identifiers verified and correct for Hugh Sorenson   LOC: The patient is awake, alert and aware of environment with an appropriate affect, the patient is oriented x 3 and speaking appropriately.   APPEARANCE: Patient appears comfortable and in no acute distress, patient is clean and well groomed.  SKIN: The skin is warm and dry, color consistent with ethnicity, patient has normal skin turgor and moist mucus membranes  MUSCULOSKELETAL: Patient moving all extremities spontaneously, no swelling noted.  RESPIRATORY: Airway is open and patent, respirations are spontaneous, patient has a normal effort and rate, no accessory muscle use noted, O2 sats noted at 96% on room air.  CARDIAC: Patient has a normal rate, no edema noted, capillary refill < 3 seconds.   GASTRO: Soft and non tender to palpation, no distention noted, normoactive bowel sounds present in all four quadrants. Pt states bowel movements have been regular.  : Pt denies any pain or frequency with urination.  NEURO: Pt opens eyes spontaneously, behavior appropriate to situation, follows commands, facial expression symmetrical, bilateral hand grasp equal and even, purposeful motor response noted. Pt c/o weakness, numbness, tingling, and pain to BLE.

## 2023-09-07 NOTE — ASSESSMENT & PLAN NOTE
Degenerative disc disease, lumbar  Lumbar disc herniation  Lumbar disc herniation with radiculopathy  Leg weakness  Acute exacerbation of chronic back pain with bilateral lower extremity weakness and inability to ambulate   - MRI L spine showing Postoperative changes of L4-L5 micro discectomy No recurrent disc herniation. No significant canal stenosis.  There is mild-moderate bilateral foraminal narrowing at L4-5.   - Neurosurgery consulted, appreciate recs   No urgent surgical findings present on MRI L spine  No neurosurgical intervention warranted at this time  No indication for admission to neurosurgical service at this time  Recommend MRI T spine w/o contrast for completion of workup given lower abdomen numbness.  - MM pain regimen   - 1 g tylenol q8h   - 750 robaxin QID    - 300 mg gabapentin TID    - 20 mg oxycodone moderate    - 30 mg oxycodone severe    - 2 mg IV dilaudid breakthrough    - lidocaine patch  - PT/OT consult

## 2023-09-07 NOTE — ED TRIAGE NOTES
"Hugh Sorenson III, a 38 y.o. male presents to the ED w/ complaint of possible thrombus in legs.pt says he had paresthesia in both legs. Pt states he had a disc surgery in January, and had a blood clot afterwards, and was taking eliquis. Pt states that he took last dose of eliquis on Sept 1st. Transfer from [facility].    Triage note:  Chief Complaint   Patient presents with    Transfer     From [facility], sent for NSGY. Had disc surgery and was taken of eliquis at the time. Sent her for suspected thrombus. Received 50mcg of fentanyl with EMS     Review of patient's allergies indicates:  No Known Allergies  Past Medical History:   Diagnosis Date    Anxiety     Back injury     "years ago    Patient identifiers for Hugh Sorenson III checked and correct.    LOC: The patient is awake, alert and aware of environment with an appropriate affect, the patient is oriented x 4 and speaking appropriately.    APPEARANCE: Patient resting comfortably and in no acute distress, patient is clean and well groomed, patient's clothing is properly fastened.    SKIN: The skin is warm and dry, color consistent with ethnicity, patient has normal skin turgor and moist mucus membranes, skin intact, no breakdown or bruising noted.    MUSCULOSKELETAL: Patient not moving all extremities well, had disc surgery in January r/t work injury, no obvious swelling or deformities noted.    RESPIRATORY: Airway is open and patent, respirations are spontaneous and even, patient has a normal effort and rate.    CARDIAC: Patient has a normal rate and rhythm, no periphreal edema noted, capillary refill < 3 seconds.    ABDOMEN: Soft and non tender to palpation, no distention noted. Patient denies any nausea, vomiting, diarrhea, or constipation.     NEUROLOGIC: Eyes open spontaneously, PERRL, behavior appropriate to situation, follows commands, facial expression symmetrical, bilateral hand grasp equal and even, purposeful motor response noted, " normal sensation in all extremities.     HEENT: No abnormalities noted. White sclera and pupils equal round and reactive to light. Denies headache, dizziness.     : Pt voids independently, denies dysuria, hematuria, frequency.

## 2023-09-07 NOTE — ASSESSMENT & PLAN NOTE
Body mass index is 39.58 kg/m². Morbid obesity complicates all aspects of disease management from diagnostic modalities to treatment. Weight loss encouraged and health benefits explained to patient.

## 2023-09-07 NOTE — HPI
38 M [employer] work in injury, L4-5 herniated disc, s/p laser disc treatment to lumbar spine in the past year presents with BLE pain and weakness. He reports pain and numbness down the lateral and anterior thighs. He reports legs giving out while trying to walk, but no segmental or lateralizing weakness. He has lower abdominal numbness. He denies saddle anesthesia or bowel bladder changes. She denies upper extremity symptoms.

## 2023-09-07 NOTE — HPI
Hugh Sorenson III is a 38 y.o. male with chronic lumbar back pain s/p L4-L5 discectomy and prior DVT s/p IVC and completion of OAC being admitted to hospital medicine as a transfer for acute on chronic back pain and lower extremity weakness. States he took a bath then got up to use the restroom when he began to have worsening lumbar back pain and weakness. He was able to get into his car and drive himself to the emergency room. No urinary or fecal incontinence, but does endorse some perianal paraesthesias. He is followed by pain management as an outpatient and is on oxycodone 20mg but states that between their home and the emergency room, his wife misplaced his medications. Denies nausea, vomiting, fever, CP or SOB. He was transferred to [facility] for neurosurgery consulted.     In ED: AFVSS. CBC and CMP grossly unremarkable. UA non-infectious. CT AP without convincing thrombus nor mass identified. MRI lumbar spine showing postoperative changes of L4-L5 micro discectomy. No recurrent disc herniation. No significant canal stenosis. There is mild-moderate bilateral foraminal narrowing at L4-5. Neurosurgery consulted, no indication for surgical intervention. Recommended MRI T spine which is pending. Given 4 mg IV morphine x2 without relief. Admitted to hospital medicine for further management.

## 2023-09-07 NOTE — SUBJECTIVE & OBJECTIVE
"Past Medical History:   Diagnosis Date    Anxiety     Back injury     "years ago       Past Surgical History:   Procedure Laterality Date    BACK SURGERY      HERNIA REPAIR      MINIMALLY INVASIVE SURGICAL REMOVAL OF INTERVERTEBRAL DISC OF SPINE USING MICROSCOPE N/A 08/26/2021    Procedure: DISCECTOMY, SPINE, MINIMALLY INVASIVE, USING MICROSCOPE; L4-5; 3hrs duration; C-arm; [facility]; Jelani flat with tabitha frame;;  Surgeon: Sage Burgess MD;  Location: [facility] MAIN OR;  Service: Neurosurgery;  Laterality: N/A;       Review of patient's allergies indicates:  No Known Allergies    No current facility-administered medications on file prior to encounter.     Current Outpatient Medications on File Prior to Encounter   Medication Sig    aspirin (ECOTRIN) 81 MG EC tablet Take 81 mg by mouth once daily.    oxyCODONE (ROXICODONE) 20 mg Tab immediate release tablet Take 1 tablet (20 mg) by mouth four times daily as needed for chronic pain    [DISCONTINUED] gabapentin (NEURONTIN) 300 MG capsule Take 1 po q hs x 3 nights then 1 po bid x 3 days then, 1 po tid continuously for nerve pain    [DISCONTINUED] naproxen (NAPROSYN) 500 MG tablet Take 1 tablet (500 mg total) by mouth 2 (two) times daily as needed (prn pain).     Family History       Problem Relation (Age of Onset)    No Known Problems Mother, Father, Sister, Brother, Maternal Aunt, Maternal Uncle, Paternal Aunt, Paternal Uncle, Maternal Grandmother, Maternal Grandfather, Paternal Grandmother, Paternal Grandfather          Tobacco Use    Smoking status: Every Day     Current packs/day: 1.00     Average packs/day: 1 pack/day for 21.0 years (21.0 ttl pk-yrs)     Types: Cigarettes, Vaping with nicotine    Smokeless tobacco: Never   Substance and Sexual Activity    Alcohol use: No    Drug use: No    Sexual activity: Not Currently     Review of Systems   Constitutional:  Negative for activity change, chills and fever.   HENT:  Negative for trouble swallowing.    Eyes:  " Negative for photophobia and visual disturbance.   Respiratory:  Negative for chest tightness, shortness of breath and wheezing.    Cardiovascular:  Negative for chest pain, palpitations and leg swelling.   Gastrointestinal:  Negative for abdominal pain, constipation, diarrhea, nausea and vomiting.        Perianal paresthesias    Genitourinary:  Negative for difficulty urinating, dysuria, frequency, hematuria and urgency.   Musculoskeletal:  Positive for back pain. Negative for arthralgias and gait problem.   Skin:  Negative for color change and rash.   Neurological:  Positive for weakness and numbness. Negative for dizziness, syncope, light-headedness and headaches.   Psychiatric/Behavioral:  Negative for agitation and confusion. The patient is not nervous/anxious.      Objective:     Vital Signs (Most Recent):  Temp: 98.1 °F (36.7 °C) (09/07/23 1403)  Pulse: 92 (09/07/23 1403)  Resp: 18 (09/07/23 1534)  BP: 123/69 (09/07/23 1403)  SpO2: 96 % (09/07/23 1403) Vital Signs (24h Range):  Temp:  [97.9 °F (36.6 °C)-98.3 °F (36.8 °C)] 98.1 °F (36.7 °C)  Pulse:  [61-92] 92  Resp:  [15-20] 18  SpO2:  [95 %-98 %] 96 %  BP: (102-129)/(45-80) 123/69     Weight: 136.1 kg (300 lb)  Body mass index is 39.58 kg/m².     Physical Exam  Vitals and nursing note reviewed.   Constitutional:       General: He is not in acute distress.     Appearance: He is well-developed.   HENT:      Head: Normocephalic and atraumatic.      Mouth/Throat:      Pharynx: No oropharyngeal exudate.   Eyes:      Conjunctiva/sclera: Conjunctivae normal.      Pupils: Pupils are equal, round, and reactive to light.   Cardiovascular:      Rate and Rhythm: Normal rate and regular rhythm.      Heart sounds: Normal heart sounds.   Pulmonary:      Effort: Pulmonary effort is normal. No respiratory distress.      Breath sounds: Normal breath sounds. No wheezing.   Abdominal:      General: Bowel sounds are normal. There is no distension.      Palpations: Abdomen is  soft.      Tenderness: There is no abdominal tenderness.   Musculoskeletal:         General: No tenderness. Normal range of motion.      Cervical back: Normal range of motion and neck supple.      Right lower leg: No edema.      Left lower leg: No edema.   Lymphadenopathy:      Cervical: No cervical adenopathy.   Skin:     General: Skin is warm and dry.      Capillary Refill: Capillary refill takes less than 2 seconds.      Findings: No rash.   Neurological:      Mental Status: He is alert and oriented to person, place, and time.      Cranial Nerves: No cranial nerve deficit.      Sensory: No sensory deficit.      Motor: No weakness or atrophy.      Coordination: Coordination normal.      Comments: 5/5 strength in all extremities   Psychiatric:         Behavior: Behavior normal.         Thought Content: Thought content normal.         Judgment: Judgment normal.              CRANIAL NERVES     CN III, IV, VI   Pupils are equal, round, and reactive to light.       Significant Labs: All pertinent labs within the past 24 hours have been reviewed.  CBC:   Recent Labs   Lab 09/07/23  0730   WBC 10.82   HGB 14.9   HCT 44.0        CMP:   Recent Labs   Lab 09/07/23  0730      K 4.4   *   CO2 19*   *   BUN 19   CREATININE 1.1   CALCIUM 9.5   PROT 7.2   ALBUMIN 4.0   BILITOT 0.4   ALKPHOS 76   AST 14   ALT 19   ANIONGAP 9       Significant Imaging: I have reviewed all pertinent imaging results/findings within the past 24 hours.  Imaging Results              CT Abdomen Pelvis With Contrast (Final result)  Result time 09/07/23 13:53:30      Final result by Yahir Pedroza Jr., MD (09/07/23 13:53:30)                   Impression:      3 mm nonobstructing left lower pole renal stone.    Hypodensities in the liver and kidneys too small to characterize.    IVC appears patent.  IVC filter with minimal penetration of the struts.  No convincing thrombus nor mass  identified.    Hepatomegaly.      Electronically signed by: Yahir Pedroza MD  Date:    09/07/2023  Time:    13:53               Narrative:    EXAMINATION:  CT ABDOMEN PELVIS WITH CONTRAST    CLINICAL HISTORY:  Abdominal pain, acute, nonlocalized;    TECHNIQUE:  Low dose axial images, sagittal and coronal reformations were obtained from the lung bases to the pubic symphysis following the IV administration of 100 mL of Omnipaque 350 .  Oral contrast was not given.    COMPARISON:  None.    FINDINGS:  In the chest, no significant pleural or pericardial fluid.  Lungs are well aerated.  No confluent consolidation or mass lesion.    In the abdomen, liver is enlarged.  Few mm hypodensities in the hepatic dome too small to characterize.  No intrahepatic biliary dilatation.  Gallbladder unremarkable.  Pancreas and spleen are normal.  No adrenal masses.  Hypodensity in the kidney too small to characterize.  3 mm nonobstructing left lower pole calculus.  No enhancing renal masses.  No hydronephrosis.    Aorta tapers normally.  IVC filter noted.  No significant para-aortic adenopathy.  IVC and bilateral iliac veins are enhanced and appear patent.  No convincing filling defect to suggest thrombus.  No convincing para-aortic nor pelvic adenopathy.  Contrast in the bladder.  Prostate not enlarged for age.    Evaluation of the bowel demonstrates scattered stool and bowel gas.  No focal dilatation.  Presumed ingested material in the stomach.  Appendix is normal.  Nonenlarged mesenteric nodes present.    Bones are well mineralized.  Alignment is satisfactory.  No convincing lytic nor blastic lesion.                                       MRI Lumbar Spine W WO Cont (Final result)  Result time 09/07/23 10:48:42      Final result by Tatum Dorsey MD (09/07/23 10:48:42)                   Impression:      Postoperative changes of L4-L5 micro discectomy. There is mild adjacent endplate edema suggestive of active degenerative change.   No recurrent disc herniation. No significant canal stenosis.  There is mild-moderate bilateral foraminal narrowing at L4-5.      Electronically signed by: Tatum Dorsey MD  Date:    09/07/2023  Time:    10:48               Narrative:    EXAMINATION:  MRI LUMBAR SPINE W WO CONTRAST    CLINICAL HISTORY:  Low back pain, prior surgery, new symptoms;    TECHNIQUE:  Multiplanar, multisequence images of the Lumbar Spine were obtained with and without the use of intravenous contrast. 10 of IV Gadavist was injected.    COMPARISON:  08/12/2021 MRI lumbar    FINDINGS:  Prior L4-5 micro discectomy (08/26/2021.    Alignment: Normal lumbar lordosis is preserved.    Vertebrae:  Body heights are well maintained. No evidence for acute fracture.  There is mild adjacent endplate edema at L4-5 suggestive of active degenerative change.    Discs:  Mild disc desiccation L3-4 through L5-S1.    Cord:  Visualized conus and lumbar spinal nerve roots demonstrate normal signal.    T12-L1 through L2-3: There is no focal disc herniation.  No significant spinal canal stenosis.  No significant neural foraminal narrowing.    L3-4:  Mild disc bulge, no significant spinal canal stenosis.  No significant neural foraminal narrowing.  The facet joints appear within normal limits.    L4-5:  Previously seen disc herniation is no longer seen.  No recurrent disc herniation.  Very mild foraminal bulge bilaterally.  There is no canal stenosis.  There is mild-moderate foraminal narrowing.    L5-S1:  Mild diffuse disc bulge.  No significant spinal canal stenosis.  No significant neural foraminal narrowing.  Mild facet joint degenerative change.    No enhancing intradural mass or abnormal leptomeningeal enhancement. No intramedullary cord enhancement.  The conus terminates at L1-L2 level.    The upper sacrum and upper sacroiliac joints appear normal.  The paravertebral and paraspinal soft tissues appear normal.                        Final result by  Tatum Dorsey MD (09/07/23 10:48:42)                   Impression:      Postoperative changes of L4-L5 micro discectomy. There is mild adjacent endplate edema suggestive of active degenerative change.  No recurrent disc herniation. No significant canal stenosis.  There is mild-moderate bilateral foraminal narrowing at L4-5.      Electronically signed by: Tatum Dorsey MD  Date:    09/07/2023  Time:    10:48               Narrative:    EXAMINATION:  MRI LUMBAR SPINE W WO CONTRAST    CLINICAL HISTORY:  Low back pain, prior surgery, new symptoms;    TECHNIQUE:  Multiplanar, multisequence images of the Lumbar Spine were obtained with and without the use of intravenous contrast. 10 of IV Gadavist was injected.    COMPARISON:  08/12/2021 MRI lumbar    FINDINGS:  Prior L4-5 micro discectomy (08/26/2021.    Alignment: Normal lumbar lordosis is preserved.    Vertebrae:  Body heights are well maintained. No evidence for acute fracture.  There is mild adjacent endplate edema at L4-5 suggestive of active degenerative change.    Discs:  Mild disc desiccation L3-4 through L5-S1.    Cord:  Visualized conus and lumbar spinal nerve roots demonstrate normal signal.    T12-L1 through L2-3: There is no focal disc herniation.  No significant spinal canal stenosis.  No significant neural foraminal narrowing.    L3-4:  Mild disc bulge, no significant spinal canal stenosis.  No significant neural foraminal narrowing.  The facet joints appear within normal limits.    L4-5:  Previously seen disc herniation is no longer seen.  No recurrent disc herniation.  Very mild foraminal bulge bilaterally.  There is no canal stenosis.  There is mild-moderate foraminal narrowing.    L5-S1:  Mild diffuse disc bulge.  No significant spinal canal stenosis.  No significant neural foraminal narrowing.  Mild facet joint degenerative change.    No enhancing intradural mass or abnormal leptomeningeal enhancement. No intramedullary cord  enhancement.  The conus terminates at L1-L2 level.    The upper sacrum and upper sacroiliac joints appear normal.  The paravertebral and paraspinal soft tissues appear normal.                        Final result by Tatum Dorsey MD (09/07/23 10:48:42)                   Impression:      Postoperative changes of L4-L5 micro discectomy. There is mild adjacent endplate edema suggestive of active degenerative change.  No recurrent disc herniation. No significant canal stenosis.  There is mild-moderate bilateral foraminal narrowing at L4-5.      Electronically signed by: Tatum Dorsey MD  Date:    09/07/2023  Time:    10:48               Narrative:    EXAMINATION:  MRI LUMBAR SPINE W WO CONTRAST    CLINICAL HISTORY:  Low back pain, prior surgery, new symptoms;    TECHNIQUE:  Multiplanar, multisequence images of the Lumbar Spine were obtained with and without the use of intravenous contrast. 10 of IV Gadavist was injected.    COMPARISON:  08/12/2021 MRI lumbar    FINDINGS:  Prior L4-5 micro discectomy (08/26/2021.    Alignment: Normal lumbar lordosis is preserved.    Vertebrae:  Body heights are well maintained. No evidence for acute fracture.  There is mild adjacent endplate edema at L4-5 suggestive of active degenerative change.    Discs:  Mild disc desiccation L3-4 through L5-S1.    Cord:  Visualized conus and lumbar spinal nerve roots demonstrate normal signal.    T12-L1 through L2-3: There is no focal disc herniation.  No significant spinal canal stenosis.  No significant neural foraminal narrowing.    L3-4:  Mild disc bulge, no significant spinal canal stenosis.  No significant neural foraminal narrowing.  The facet joints appear within normal limits.    L4-5:  Previously seen disc herniation is no longer seen.  No recurrent disc herniation.  Very mild foraminal bulge bilaterally.  There is no canal stenosis.  There is mild-moderate foraminal narrowing.    L5-S1:  Mild diffuse disc bulge.  No  significant spinal canal stenosis.  No significant neural foraminal narrowing.  Mild facet joint degenerative change.    No enhancing intradural mass or abnormal leptomeningeal enhancement. No intramedullary cord enhancement.  The conus terminates at L1-L2 level.    The upper sacrum and upper sacroiliac joints appear normal.  The paravertebral and paraspinal soft tissues appear normal.

## 2023-09-07 NOTE — ED PROVIDER NOTES
"Encounter Date: 9/7/2023       History     Chief Complaint   Patient presents with    Transfer     From [facility], sent for NSGY. Had disc surgery and was taken of eliquis at the time. Sent her for suspected thrombus. Received 50mcg of fentanyl with EMS     The history is provided by the patient. No  was used.     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 The symptoms began last night approximately 11/12 last night, when the patient was experiencing pain and wasn't able to sleep. He tried to walk and after 10 seconds, realized he was unable to walk stating it "feels like my legs are falling off." He drove himself to [facility] for evaluation. A CT lumbar spine did not reveal any abnormalities, and it was determined that he needed to be transferred for MRI out of concern for clot.    At their facility: Na 142, K 3.3, Cl 108, CO2 19, Cr 1.33, Ca 9.4, Mg 1.8, WBC 12.3, Hgb 15.2, PT 13.5, INR 1.24, PTT 29.8, HS D-Dimer 553. Neuro exam revealed: 4/5 BLE strength ,3+ patellar reflexes bilaterally. Patient endorses hot/cold sensation changes per their testing. Prior to transfer, patient given Heparin 5000U, Toradol, demerol, decadron.     Review of patient's allergies indicates:  No Known Allergies  Past Medical History:   Diagnosis Date    Anxiety     Back injury     "years ago     Past Surgical History:   Procedure Laterality Date    BACK SURGERY      HERNIA REPAIR      MINIMALLY INVASIVE SURGICAL REMOVAL OF INTERVERTEBRAL DISC OF SPINE USING MICROSCOPE N/A 08/26/2021    Procedure: DISCECTOMY, SPINE, MINIMALLY INVASIVE, USING MICROSCOPE; L4-5; 3hrs duration; C-arm; METRx; Jelani flat with tabitha frame;;  Surgeon: Sage Burgess MD;  Location: [facility] MAIN OR;  Service: Neurosurgery;  Laterality: N/A;     Family History   Problem Relation Age of Onset    No " Known Problems Mother     No Known Problems Father     No Known Problems Sister     No Known Problems Brother     No Known Problems Maternal Aunt     No Known Problems Maternal Uncle     No Known Problems Paternal Aunt     No Known Problems Paternal Uncle     No Known Problems Maternal Grandmother     No Known Problems Maternal Grandfather     No Known Problems Paternal Grandmother     No Known Problems Paternal Grandfather     Aneurysm Neg Hx     Cancer Neg Hx     Clotting disorder Neg Hx     Dementia Neg Hx     Diabetes Neg Hx     Fainting Neg Hx     Heart disease Neg Hx     Hyperlipidemia Neg Hx     Kidney disease Neg Hx     Liver disease Neg Hx     Migraines Neg Hx     Neuropathy Neg Hx     Obesity Neg Hx     Parkinsonism Neg Hx     Seizures Neg Hx     Stroke Neg Hx     Tremor Neg Hx      Social History     Tobacco Use    Smoking status: Every Day     Current packs/day: 1.00     Average packs/day: 1 pack/day for 21.0 years (21.0 ttl pk-yrs)     Types: Cigarettes, Vaping with nicotine    Smokeless tobacco: Never   Substance Use Topics    Alcohol use: No    Drug use: No     Review of Systems   Constitutional:  Negative for fever.   Gastrointestinal:  Positive for abdominal pain.        Negative for Stool incontinence   Genitourinary:         Negative for urinary incontinence   Musculoskeletal:  Positive for back pain.   Neurological:  Positive for weakness and numbness.       Physical Exam     Initial Vitals [09/07/23 0601]   BP Pulse Resp Temp SpO2   129/72 78 20 98.3 °F (36.8 °C) 98 %      MAP       --         Physical Exam    Constitutional: He appears well-developed and well-nourished.   HENT:   Head: Normocephalic and atraumatic.   Eyes: Conjunctivae and EOM are normal.   Cardiovascular:  Normal rate, regular rhythm and normal heart sounds.     Exam reveals no gallop and no friction rub.       No murmur heard.  Pulmonary/Chest: Breath sounds normal. No respiratory distress. He  "has no wheezes. He has no rhonchi.   Abdominal: Abdomen is soft. He exhibits no distension. There is abdominal tenderness (epigastric tenderness to umbilicus; pain distributing laterally at "belt-line").     Neurological: He is alert. He has normal strength. He displays abnormal reflex (patellar reflexes slightly hyperreflexive). A sensory deficit (patient describes numbness sensation with light touch, sometimes able to differentiate b/w dull and sharp for lower extremities) is present.       ED Course   Procedures  Labs Reviewed   COMPREHENSIVE METABOLIC PANEL - Abnormal; Notable for the following components:       Result Value    Chloride 111 (*)     CO2 19 (*)     Glucose 124 (*)     All other components within normal limits   CBC W/ AUTO DIFFERENTIAL - Abnormal; Notable for the following components:    Immature Granulocytes 0.6 (*)     Gran # (ANC) 9.5 (*)     Immature Grans (Abs) 0.06 (*)     Mono # 0.1 (*)     Gran % 87.7 (*)     Lymph % 9.9 (*)     Mono % 1.2 (*)     All other components within normal limits   URINALYSIS, REFLEX TO URINE CULTURE - Abnormal; Notable for the following components:    Specific Gravity, UA >=1.030 (*)     All other components within normal limits    Narrative:     Specimen Source->Urine   HIV 1 / 2 ANTIBODY    Narrative:     Release to patient->Immediate   HEPATITIS C ANTIBODY    Narrative:     Release to patient->Immediate   LIPASE   APTT   PROTIME-INR   CK   CK    Narrative:     Add on CPK per SILVANA ROBERTS Epic order [id number]  10:35  09/07/2023           Imaging Results              CT Abdomen Pelvis With Contrast (Final result)  Result time 09/07/23 13:53:30      Final result by Yahir Pedroza Jr., MD (09/07/23 13:53:30)                   Impression:      3 mm nonobstructing left lower pole renal stone.    Hypodensities in the liver and kidneys too small to characterize.    IVC appears patent.  IVC filter with minimal penetration of the struts.  No convincing thrombus nor " mass identified.    Hepatomegaly.      Electronically signed by: Yahir Pedroza MD  Date:    09/07/2023  Time:    13:53               Narrative:    EXAMINATION:  CT ABDOMEN PELVIS WITH CONTRAST    CLINICAL HISTORY:  Abdominal pain, acute, nonlocalized;    TECHNIQUE:  Low dose axial images, sagittal and coronal reformations were obtained from the lung bases to the pubic symphysis following the IV administration of 100 mL of Omnipaque 350 .  Oral contrast was not given.    COMPARISON:  None.    FINDINGS:  In the chest, no significant pleural or pericardial fluid.  Lungs are well aerated.  No confluent consolidation or mass lesion.    In the abdomen, liver is enlarged.  Few mm hypodensities in the hepatic dome too small to characterize.  No intrahepatic biliary dilatation.  Gallbladder unremarkable.  Pancreas and spleen are normal.  No adrenal masses.  Hypodensity in the kidney too small to characterize.  3 mm nonobstructing left lower pole calculus.  No enhancing renal masses.  No hydronephrosis.    Aorta tapers normally.  IVC filter noted.  No significant para-aortic adenopathy.  IVC and bilateral iliac veins are enhanced and appear patent.  No convincing filling defect to suggest thrombus.  No convincing para-aortic nor pelvic adenopathy.  Contrast in the bladder.  Prostate not enlarged for age.    Evaluation of the bowel demonstrates scattered stool and bowel gas.  No focal dilatation.  Presumed ingested material in the stomach.  Appendix is normal.  Nonenlarged mesenteric nodes present.    Bones are well mineralized.  Alignment is satisfactory.  No convincing lytic nor blastic lesion.                                       MRI Lumbar Spine W WO Cont (Final result)  Result time 09/07/23 10:48:42      Final result by Tatum Dorsey MD (09/07/23 10:48:42)                   Impression:      Postoperative changes of L4-L5 micro discectomy. There is mild adjacent endplate edema suggestive of active degenerative  change.  No recurrent disc herniation. No significant canal stenosis.  There is mild-moderate bilateral foraminal narrowing at L4-5.      Electronically signed by: Tatum Dorsey MD  Date:    09/07/2023  Time:    10:48               Narrative:    EXAMINATION:  MRI LUMBAR SPINE W WO CONTRAST    CLINICAL HISTORY:  Low back pain, prior surgery, new symptoms;    TECHNIQUE:  Multiplanar, multisequence images of the Lumbar Spine were obtained with and without the use of intravenous contrast. 10 of IV Gadavist was injected.    COMPARISON:  08/12/2021 MRI lumbar    FINDINGS:  Prior L4-5 micro discectomy (08/26/2021.    Alignment: Normal lumbar lordosis is preserved.    Vertebrae:  Body heights are well maintained. No evidence for acute fracture.  There is mild adjacent endplate edema at L4-5 suggestive of active degenerative change.    Discs:  Mild disc desiccation L3-4 through L5-S1.    Cord:  Visualized conus and lumbar spinal nerve roots demonstrate normal signal.    T12-L1 through L2-3: There is no focal disc herniation.  No significant spinal canal stenosis.  No significant neural foraminal narrowing.    L3-4:  Mild disc bulge, no significant spinal canal stenosis.  No significant neural foraminal narrowing.  The facet joints appear within normal limits.    L4-5:  Previously seen disc herniation is no longer seen.  No recurrent disc herniation.  Very mild foraminal bulge bilaterally.  There is no canal stenosis.  There is mild-moderate foraminal narrowing.    L5-S1:  Mild diffuse disc bulge.  No significant spinal canal stenosis.  No significant neural foraminal narrowing.  Mild facet joint degenerative change.    No enhancing intradural mass or abnormal leptomeningeal enhancement. No intramedullary cord enhancement.  The conus terminates at L1-L2 level.    The upper sacrum and upper sacroiliac joints appear normal.  The paravertebral and paraspinal soft tissues appear normal.                        Final result  by Tatum Dorsey MD (09/07/23 10:48:42)                   Impression:      Postoperative changes of L4-L5 micro discectomy. There is mild adjacent endplate edema suggestive of active degenerative change.  No recurrent disc herniation. No significant canal stenosis.  There is mild-moderate bilateral foraminal narrowing at L4-5.      Electronically signed by: Tatum Dorsey MD  Date:    09/07/2023  Time:    10:48               Narrative:    EXAMINATION:  MRI LUMBAR SPINE W WO CONTRAST    CLINICAL HISTORY:  Low back pain, prior surgery, new symptoms;    TECHNIQUE:  Multiplanar, multisequence images of the Lumbar Spine were obtained with and without the use of intravenous contrast. 10 of IV Gadavist was injected.    COMPARISON:  08/12/2021 MRI lumbar    FINDINGS:  Prior L4-5 micro discectomy (08/26/2021.    Alignment: Normal lumbar lordosis is preserved.    Vertebrae:  Body heights are well maintained. No evidence for acute fracture.  There is mild adjacent endplate edema at L4-5 suggestive of active degenerative change.    Discs:  Mild disc desiccation L3-4 through L5-S1.    Cord:  Visualized conus and lumbar spinal nerve roots demonstrate normal signal.    T12-L1 through L2-3: There is no focal disc herniation.  No significant spinal canal stenosis.  No significant neural foraminal narrowing.    L3-4:  Mild disc bulge, no significant spinal canal stenosis.  No significant neural foraminal narrowing.  The facet joints appear within normal limits.    L4-5:  Previously seen disc herniation is no longer seen.  No recurrent disc herniation.  Very mild foraminal bulge bilaterally.  There is no canal stenosis.  There is mild-moderate foraminal narrowing.    L5-S1:  Mild diffuse disc bulge.  No significant spinal canal stenosis.  No significant neural foraminal narrowing.  Mild facet joint degenerative change.    No enhancing intradural mass or abnormal leptomeningeal enhancement. No intramedullary cord  enhancement.  The conus terminates at L1-L2 level.    The upper sacrum and upper sacroiliac joints appear normal.  The paravertebral and paraspinal soft tissues appear normal.                        Final result by Tatum Dorsey MD (09/07/23 10:48:42)                   Impression:      Postoperative changes of L4-L5 micro discectomy. There is mild adjacent endplate edema suggestive of active degenerative change.  No recurrent disc herniation. No significant canal stenosis.  There is mild-moderate bilateral foraminal narrowing at L4-5.      Electronically signed by: Tatum Dorsey MD  Date:    09/07/2023  Time:    10:48               Narrative:    EXAMINATION:  MRI LUMBAR SPINE W WO CONTRAST    CLINICAL HISTORY:  Low back pain, prior surgery, new symptoms;    TECHNIQUE:  Multiplanar, multisequence images of the Lumbar Spine were obtained with and without the use of intravenous contrast. 10 of IV Gadavist was injected.    COMPARISON:  08/12/2021 MRI lumbar    FINDINGS:  Prior L4-5 micro discectomy (08/26/2021.    Alignment: Normal lumbar lordosis is preserved.    Vertebrae:  Body heights are well maintained. No evidence for acute fracture.  There is mild adjacent endplate edema at L4-5 suggestive of active degenerative change.    Discs:  Mild disc desiccation L3-4 through L5-S1.    Cord:  Visualized conus and lumbar spinal nerve roots demonstrate normal signal.    T12-L1 through L2-3: There is no focal disc herniation.  No significant spinal canal stenosis.  No significant neural foraminal narrowing.    L3-4:  Mild disc bulge, no significant spinal canal stenosis.  No significant neural foraminal narrowing.  The facet joints appear within normal limits.    L4-5:  Previously seen disc herniation is no longer seen.  No recurrent disc herniation.  Very mild foraminal bulge bilaterally.  There is no canal stenosis.  There is mild-moderate foraminal narrowing.    L5-S1:  Mild diffuse disc bulge.  No  significant spinal canal stenosis.  No significant neural foraminal narrowing.  Mild facet joint degenerative change.    No enhancing intradural mass or abnormal leptomeningeal enhancement. No intramedullary cord enhancement.  The conus terminates at L1-L2 level.    The upper sacrum and upper sacroiliac joints appear normal.  The paravertebral and paraspinal soft tissues appear normal.                                       Medications   sodium chloride 0.9% flush 10 mL (has no administration in time range)   melatonin tablet 6 mg (has no administration in time range)   ondansetron disintegrating tablet 8 mg (has no administration in time range)   ondansetron injection 4 mg (has no administration in time range)   polyethylene glycol packet 17 g (has no administration in time range)   acetaminophen tablet 650 mg (has no administration in time range)   simethicone chewable tablet 80 mg (has no administration in time range)   aluminum-magnesium hydroxide-simethicone 200-200-20 mg/5 mL suspension 30 mL (has no administration in time range)   acetaminophen tablet 650 mg (has no administration in time range)   naloxone 0.4 mg/mL injection 0.02 mg (has no administration in time range)   glucose chewable tablet 16 g (has no administration in time range)   glucose chewable tablet 24 g (has no administration in time range)   glucagon (human recombinant) injection 1 mg (has no administration in time range)   enoxaparin injection 40 mg (has no administration in time range)   oxyCODONE immediate release tablet 20 mg (has no administration in time range)   oxyCODONE immediate release tablet 30 mg (has no administration in time range)   methocarbamoL tablet 750 mg (750 mg Oral Given 9/7/23 1526)   acetaminophen tablet 1,000 mg (1,000 mg Oral Given 9/7/23 1526)   LIDOcaine 5 % patch 1 patch (1 patch Transdermal Patch Applied 9/7/23 1527)   gabapentin capsule 300 mg (300 mg Oral Given 9/7/23 1526)   dextrose 10% bolus 125 mL 125 mL  (has no administration in time range)   dextrose 10% bolus 250 mL 250 mL (has no administration in time range)   HYDROmorphone injection 2 mg (has no administration in time range)   morphine injection 4 mg (4 mg Intravenous Given 9/7/23 0733)   gadobutroL (GADAVIST) injection 10 mL (10 mLs Intravenous Given 9/7/23 1024)   morphine injection 4 mg (4 mg Intravenous Given 9/7/23 1150)   iohexoL (OMNIPAQUE 350) injection 100 mL (100 mLs Intravenous Given 9/7/23 1338)     Medical Decision Making  Patient is a 39yo male with hx of DLDR L3-S1 in January with IVC filter d/t DVT, just finished Eliquis regimen, was a transfer for MRI due to BLE weakness and sensory deficits. Patient reported feeling low back pain with BLE weakness that made him feel like he would be unable to walk with associated burning/numbness/shooting down his legs. MRI lumbar spine obtained, not revealing any acute abnormalities beyond mild to moderate bilateral foraminal narrowing at L4-5 with a mild disc bulge at L3-4 , and neurosurgery evaluated him, not identifying any acute surgical problem or indication for neurosurgery admit. Morphine relieved his back pain, but he is still endorsing the subjective weakness and sensory changes. CT abd/pelvis didn't reveal any thrombus or intra-abdominal vascular cause for his symptoms.  Due to persistence of symptoms and patient remaining nonambulatory, patient to be admitted to Internal Medicine for observation.      Amount and/or Complexity of Data Reviewed  Labs: ordered. Decision-making details documented in ED Course.  Radiology: ordered.    Risk  Prescription drug management.              Attending Attestation:   Physician Attestation Statement for Resident:  As the supervising MD   Physician Attestation Statement: I have personally seen and examined this patient.   I agree with the above history.  - with the following exceptions: External sources of information: Patient's wife.   37 yo male who presents  "with increased lower back pain and bilateral lower extremity pain as well as sensory changes that he describes using multiple adjectives including hot/cold/like "bubbles popping" in his legs/decreased sensation throughout that is stocking glove to bilateral lower extremities as well as up to his lower abdomen just below the umbilicus. He states that these symptoms started yesterday evening.  He has a h/o disc procedure at what sounds like lower lumbar region earlier in the year, followed by DVT on eliquis until recently, and at baseline walks with walker or cane and has daily pain in his lower back.  He also states that he is had a couple of episodes prior in which he is had increased discomfort in his lower back and symptoms into his legs but none as severe as the 1 that he has had since yesterday and never with the sensory changes that he has had since yesterday.  He has had no trauma yesterday or any other changes that he feels could have brought on the symptoms.  Has no fevers or chills.  He states that he has had some abdominal discomfort that is fairly diffuse over the course of the past couple of weeks with a different time course than that of the back pain.  He is not had any incontinence of stool or urine.  He is not had any falls.  He states he was able to drive himself to the hospital last night.   As the supervising MD I agree with the above PE.   - with the following exceptions: VS upon arrival:  129/79; 69; 20; 95% room air; 97.9° F oral.  Consititutional: Pt is awake, alert, and oriented x 4. Does not appear to be in any julio cesar distress.  HEENT: PERRL; EOMI; nares patent; op clear; mmm without lesions.  Neck: Supple with good ROM. No midline c spine ttp.   CV: Normal rate; regular rhythm; no mrg. Heart sounds normal. No peripheral edema. No homans or cords. 2+ radials bilateral and symmetric.  Respiratory: CTA bilaterally with no focal rales, ronchi, or wheezes.  GI: Abdomen soft, NT on my exam; ND. No " rebound. No guarding. BS normal.   MSK: No long bone deformities; no focal joint swellings. TTP to the midline lumbar spine (basically throughout lumbar) without any overlying skin changes or swelling to the back. The patient has 5/5 strength throughout his extremities on my examination. He has 3+ patellar reflexes that are bilateral and symmetric. He is able to feel fine touch grossly throughout but he states that subjectively he feels as though his sensation is different throughout his lower extremities and up onto his lower abdomen (below the umbilicus).;   Neuro: As above.   Skin: No skin lesions or rash.       As the supervising MD I agree with the above treatment, course, plan, and disposition.   - with the following exceptions: This patient has a h/o disc procedure to the lumbar spine (we don't know a lot of details about this procedure at this point - happened in [place], [place]). He also has a h/o DVT on eliquis that was recently discontinued. He has odd LE symptoms with pain and change in sensation that is stocking glove and is to the level of just below the umbilicus. He has increased patellar reflexes bilaterally. He has normal strength on my exam. Differential includes: some sort of mechanical change at the lower spine (lumbar primarily) such as cord impingement or a clot of one of the vessels supplying the cord/cord ischemia which I feel is less likely; a clot in the IVC or iliacs that could affect both lower extremities, especially with the recent discontinuation of his eliquis; or exacerbation of his known discomfort associated with his lower back (particularly given the fact that he states he has had exacerbations similar to this in the past).  Laboratory studies and radiographic imaging starting with an MRI with and without contrast of the lumbar spine were ordered.  I have reviewed the patient's laboratory studies thus far.  His CBC is normal.  His CMP is reassuring there are some subtle  abnormalities including a bicarb of 19 but no significant change.  His CPK 73.  His urinalysis is negative for infection.  His MRI of the lumbar spine with and without contrast, independently reviewed and interpreted by me and interpreted by Radiology, does not reveal any acute abnormal cord signal or significant compression.  There was no abnormality found on this MRI that could explain the patient's symptoms.  Neurosurgery has also been formally consulted, we spoke about the case in detail with them, and they have recommended a thoracic MRI be added on as well but do not find any acute finding that would necessitate an emergent neurosurgical intervention.  Furthermore, as we continued to search for causes of the patient's symptoms of discomfort and sensory changes to his lower extremities, we also obtained a CT of the abdomen and pelvis with contrast primarily to look at the patient's IVC and central vessels to assure that there was no venous occlusion they are given his history of clot and recent discontinuation of Eliquis.  However this imaging was also reassuring upon my independent review and interpretation as well as radiology's interpretation.  At this point, the patient is still can not ambulate at his baseline.  He continues to have these sensory changes to his lower extremities.  He continues to have difficulty with pain control having required multiple doses of parenteral narcotics.  I feel that he will require admission to Hospital Medicine for further evaluation and care.  ED diagnosis:  1. Acute bilateral lower extremity sensory changes/deficits.  2. Acute exacerbation of chronic lower back and bilateral lower extremity pain.  3. Above diagnoses complicated by previous history of disc surgery to lumbar spine as well as DVT requiring anticoagulation.      I have reviewed and agree with the residents interpretation of the following: lab data.  I have reviewed the following: old records at this facility.               ED Course as of 09/07/23 1531   u Sep 07, 2023   0822 CBC auto differential(!)  Normal WBC, hct, and plts.  [NM]   0822 Comprehensive metabolic panel(!)  CMP globally reassuring other than mild depression of bicarb of 19. [NM]      ED Course User Index  [NM] Luz Sinha MD                    Clinical Impression:   Final diagnoses:  [R29.898] Weakness of both legs        ED Disposition Condition    Observation                 Pam Rosenberg,   Resident  09/07/23 1533       Luz Sinha MD  09/07/23 3762

## 2023-09-07 NOTE — H&P
[facility] - Emergency Dept  Hospital Medicine  History & Physical    Patient Name: Hugh Sorenson III  MRN: [id number]  Patient Class: OP- Observation  Admission Date: 9/7/2023  Attending Physician: Yohan Chow MD   Primary Care Provider: [facility] Primary Doctor         Patient information was obtained from patient, past medical records and ER records.     Subjective:     Principal Problem:Acute exacerbation of chronic low back pain    Chief Complaint:   Chief Complaint   Patient presents with    Transfer     From [facility], sent for NSGY. Had disc surgery and was taken of eliquis at the time. Sent her for suspected thrombus. Received 50mcg of fentanyl with EMS        HPI: Hugh Sorenson III is a 38 y.o. male with chronic lumbar back pain s/p L4-L5 discectomy and prior DVT s/p IVC and completion of OAC being admitted to hospital medicine as a transfer for acute on chronic back pain and lower extremity weakness. States he took a bath then got up to use the restroom when he began to have worsening lumbar back pain and weakness. He was able to get into his car and drive himself to the emergency room. No urinary or fecal incontinence, but does endorse some perianal paraesthesias. He is followed by pain management as an outpatient and is on oxycodone 20mg but states that between their home and the emergency room, his wife misplaced his medications. Denies nausea, vomiting, fever, CP or SOB. He was transferred to [facility] for neurosurgery consulted.     In ED: AFVSS. CBC and CMP grossly unremarkable. UA non-infectious. CT AP without convincing thrombus nor mass identified. MRI lumbar spine showing postoperative changes of L4-L5 micro discectomy. No recurrent disc herniation. No significant canal stenosis. There is mild-moderate bilateral foraminal narrowing at L4-5. Neurosurgery consulted, no indication for surgical intervention. Recommended MRI T spine which is pending. Given 4 mg IV morphine x2  "without relief. Admitted to hospital medicine for further management.       Past Medical History:   Diagnosis Date    Anxiety     Back injury     "years ago       Past Surgical History:   Procedure Laterality Date    BACK SURGERY      HERNIA REPAIR      MINIMALLY INVASIVE SURGICAL REMOVAL OF INTERVERTEBRAL DISC OF SPINE USING MICROSCOPE N/A 08/26/2021    Procedure: DISCECTOMY, SPINE, MINIMALLY INVASIVE, USING MICROSCOPE; L4-5; 3hrs duration; C-arm; METRx; Jelani flat with tabitha frame;;  Surgeon: Sage Burgess MD;  Location: [facility] MAIN OR;  Service: Neurosurgery;  Laterality: N/A;       Review of patient's allergies indicates:  No Known Allergies    No current facility-administered medications on file prior to encounter.     Current Outpatient Medications on File Prior to Encounter   Medication Sig    aspirin (ECOTRIN) 81 MG EC tablet Take 81 mg by mouth once daily.    oxyCODONE (ROXICODONE) 20 mg Tab immediate release tablet Take 1 tablet (20 mg) by mouth four times daily as needed for chronic pain    [DISCONTINUED] gabapentin (NEURONTIN) 300 MG capsule Take 1 po q hs x 3 nights then 1 po bid x 3 days then, 1 po tid continuously for nerve pain    [DISCONTINUED] naproxen (NAPROSYN) 500 MG tablet Take 1 tablet (500 mg total) by mouth 2 (two) times daily as needed (prn pain).     Family History       Problem Relation (Age of Onset)    No Known Problems Mother, Father, Sister, Brother, Maternal Aunt, Maternal Uncle, Paternal Aunt, Paternal Uncle, Maternal Grandmother, Maternal Grandfather, Paternal Grandmother, Paternal Grandfather          Tobacco Use    Smoking status: Every Day     Current packs/day: 1.00     Average packs/day: 1 pack/day for 21.0 years (21.0 ttl pk-yrs)     Types: Cigarettes, Vaping with nicotine    Smokeless tobacco: Never   Substance and Sexual Activity    Alcohol use: No    Drug use: No    Sexual activity: Not Currently     Review of Systems   Constitutional:  Negative for " activity change, chills and fever.   HENT:  Negative for trouble swallowing.    Eyes:  Negative for photophobia and visual disturbance.   Respiratory:  Negative for chest tightness, shortness of breath and wheezing.    Cardiovascular:  Negative for chest pain, palpitations and leg swelling.   Gastrointestinal:  Negative for abdominal pain, constipation, diarrhea, nausea and vomiting.        Perianal paresthesias    Genitourinary:  Negative for difficulty urinating, dysuria, frequency, hematuria and urgency.   Musculoskeletal:  Positive for back pain. Negative for arthralgias and gait problem.   Skin:  Negative for color change and rash.   Neurological:  Positive for weakness and numbness. Negative for dizziness, syncope, light-headedness and headaches.   Psychiatric/Behavioral:  Negative for agitation and confusion. The patient is not nervous/anxious.      Objective:     Vital Signs (Most Recent):  Temp: 98.1 °F (36.7 °C) (09/07/23 1403)  Pulse: 92 (09/07/23 1403)  Resp: 18 (09/07/23 1534)  BP: 123/69 (09/07/23 1403)  SpO2: 96 % (09/07/23 1403) Vital Signs (24h Range):  Temp:  [97.9 °F (36.6 °C)-98.3 °F (36.8 °C)] 98.1 °F (36.7 °C)  Pulse:  [61-92] 92  Resp:  [15-20] 18  SpO2:  [95 %-98 %] 96 %  BP: (102-129)/(45-80) 123/69     Weight: 136.1 kg (300 lb)  Body mass index is 39.58 kg/m².     Physical Exam  Vitals and nursing note reviewed.   Constitutional:       General: He is not in acute distress.     Appearance: He is well-developed.   HENT:      Head: Normocephalic and atraumatic.      Mouth/Throat:      Pharynx: No oropharyngeal exudate.   Eyes:      Conjunctiva/sclera: Conjunctivae normal.      Pupils: Pupils are equal, round, and reactive to light.   Cardiovascular:      Rate and Rhythm: Normal rate and regular rhythm.      Heart sounds: Normal heart sounds.   Pulmonary:      Effort: Pulmonary effort is normal. No respiratory distress.      Breath sounds: Normal breath sounds. No wheezing.   Abdominal:       General: Bowel sounds are normal. There is no distension.      Palpations: Abdomen is soft.      Tenderness: There is no abdominal tenderness.   Musculoskeletal:         General: No tenderness. Normal range of motion.      Cervical back: Normal range of motion and neck supple.      Right lower leg: No edema.      Left lower leg: No edema.   Lymphadenopathy:      Cervical: No cervical adenopathy.   Skin:     General: Skin is warm and dry.      Capillary Refill: Capillary refill takes less than 2 seconds.      Findings: No rash.   Neurological:      Mental Status: He is alert and oriented to person, place, and time.      Cranial Nerves: No cranial nerve deficit.      Sensory: No sensory deficit.      Motor: No weakness or atrophy.      Coordination: Coordination normal.      Comments: 5/5 strength in all extremities   Psychiatric:         Behavior: Behavior normal.         Thought Content: Thought content normal.         Judgment: Judgment normal.              CRANIAL NERVES     CN III, IV, VI   Pupils are equal, round, and reactive to light.       Significant Labs: All pertinent labs within the past 24 hours have been reviewed.  CBC:   Recent Labs   Lab 09/07/23  0730   WBC 10.82   HGB 14.9   HCT 44.0        CMP:   Recent Labs   Lab 09/07/23  0730      K 4.4   *   CO2 19*   *   BUN 19   CREATININE 1.1   CALCIUM 9.5   PROT 7.2   ALBUMIN 4.0   BILITOT 0.4   ALKPHOS 76   AST 14   ALT 19   ANIONGAP 9       Significant Imaging: I have reviewed all pertinent imaging results/findings within the past 24 hours.  Imaging Results              CT Abdomen Pelvis With Contrast (Final result)  Result time 09/07/23 13:53:30      Final result by Yahir Pedroza Jr., MD (09/07/23 13:53:30)                   Impression:      3 mm nonobstructing left lower pole renal stone.    Hypodensities in the liver and kidneys too small to characterize.    IVC appears patent.  IVC filter with minimal penetration of the  struts.  No convincing thrombus nor mass identified.    Hepatomegaly.      Electronically signed by: Yahir Pedroza MD  Date:    09/07/2023  Time:    13:53               Narrative:    EXAMINATION:  CT ABDOMEN PELVIS WITH CONTRAST    CLINICAL HISTORY:  Abdominal pain, acute, nonlocalized;    TECHNIQUE:  Low dose axial images, sagittal and coronal reformations were obtained from the lung bases to the pubic symphysis following the IV administration of 100 mL of Omnipaque 350 .  Oral contrast was not given.    COMPARISON:  None.    FINDINGS:  In the chest, no significant pleural or pericardial fluid.  Lungs are well aerated.  No confluent consolidation or mass lesion.    In the abdomen, liver is enlarged.  Few mm hypodensities in the hepatic dome too small to characterize.  No intrahepatic biliary dilatation.  Gallbladder unremarkable.  Pancreas and spleen are normal.  No adrenal masses.  Hypodensity in the kidney too small to characterize.  3 mm nonobstructing left lower pole calculus.  No enhancing renal masses.  No hydronephrosis.    Aorta tapers normally.  IVC filter noted.  No significant para-aortic adenopathy.  IVC and bilateral iliac veins are enhanced and appear patent.  No convincing filling defect to suggest thrombus.  No convincing para-aortic nor pelvic adenopathy.  Contrast in the bladder.  Prostate not enlarged for age.    Evaluation of the bowel demonstrates scattered stool and bowel gas.  No focal dilatation.  Presumed ingested material in the stomach.  Appendix is normal.  Nonenlarged mesenteric nodes present.    Bones are well mineralized.  Alignment is satisfactory.  No convincing lytic nor blastic lesion.                                       MRI Lumbar Spine W WO Cont (Final result)  Result time 09/07/23 10:48:42      Final result by Tatum Dorsey MD (09/07/23 10:48:42)                   Impression:      Postoperative changes of L4-L5 micro discectomy. There is mild adjacent endplate edema  suggestive of active degenerative change.  No recurrent disc herniation. No significant canal stenosis.  There is mild-moderate bilateral foraminal narrowing at L4-5.      Electronically signed by: Tatum Dorsey MD  Date:    09/07/2023  Time:    10:48               Narrative:    EXAMINATION:  MRI LUMBAR SPINE W WO CONTRAST    CLINICAL HISTORY:  Low back pain, prior surgery, new symptoms;    TECHNIQUE:  Multiplanar, multisequence images of the Lumbar Spine were obtained with and without the use of intravenous contrast. 10 of IV Gadavist was injected.    COMPARISON:  08/12/2021 MRI lumbar    FINDINGS:  Prior L4-5 micro discectomy (08/26/2021.    Alignment: Normal lumbar lordosis is preserved.    Vertebrae:  Body heights are well maintained. No evidence for acute fracture.  There is mild adjacent endplate edema at L4-5 suggestive of active degenerative change.    Discs:  Mild disc desiccation L3-4 through L5-S1.    Cord:  Visualized conus and lumbar spinal nerve roots demonstrate normal signal.    T12-L1 through L2-3: There is no focal disc herniation.  No significant spinal canal stenosis.  No significant neural foraminal narrowing.    L3-4:  Mild disc bulge, no significant spinal canal stenosis.  No significant neural foraminal narrowing.  The facet joints appear within normal limits.    L4-5:  Previously seen disc herniation is no longer seen.  No recurrent disc herniation.  Very mild foraminal bulge bilaterally.  There is no canal stenosis.  There is mild-moderate foraminal narrowing.    L5-S1:  Mild diffuse disc bulge.  No significant spinal canal stenosis.  No significant neural foraminal narrowing.  Mild facet joint degenerative change.    No enhancing intradural mass or abnormal leptomeningeal enhancement. No intramedullary cord enhancement.  The conus terminates at L1-L2 level.    The upper sacrum and upper sacroiliac joints appear normal.  The paravertebral and paraspinal soft tissues appear normal.                         Final result by Tatum Dorsey MD (09/07/23 10:48:42)                   Impression:      Postoperative changes of L4-L5 micro discectomy. There is mild adjacent endplate edema suggestive of active degenerative change.  No recurrent disc herniation. No significant canal stenosis.  There is mild-moderate bilateral foraminal narrowing at L4-5.      Electronically signed by: Tatum Dorsey MD  Date:    09/07/2023  Time:    10:48               Narrative:    EXAMINATION:  MRI LUMBAR SPINE W WO CONTRAST    CLINICAL HISTORY:  Low back pain, prior surgery, new symptoms;    TECHNIQUE:  Multiplanar, multisequence images of the Lumbar Spine were obtained with and without the use of intravenous contrast. 10 of IV Gadavist was injected.    COMPARISON:  08/12/2021 MRI lumbar    FINDINGS:  Prior L4-5 micro discectomy (08/26/2021.    Alignment: Normal lumbar lordosis is preserved.    Vertebrae:  Body heights are well maintained. No evidence for acute fracture.  There is mild adjacent endplate edema at L4-5 suggestive of active degenerative change.    Discs:  Mild disc desiccation L3-4 through L5-S1.    Cord:  Visualized conus and lumbar spinal nerve roots demonstrate normal signal.    T12-L1 through L2-3: There is no focal disc herniation.  No significant spinal canal stenosis.  No significant neural foraminal narrowing.    L3-4:  Mild disc bulge, no significant spinal canal stenosis.  No significant neural foraminal narrowing.  The facet joints appear within normal limits.    L4-5:  Previously seen disc herniation is no longer seen.  No recurrent disc herniation.  Very mild foraminal bulge bilaterally.  There is no canal stenosis.  There is mild-moderate foraminal narrowing.    L5-S1:  Mild diffuse disc bulge.  No significant spinal canal stenosis.  No significant neural foraminal narrowing.  Mild facet joint degenerative change.    No enhancing intradural mass or abnormal leptomeningeal  enhancement. No intramedullary cord enhancement.  The conus terminates at L1-L2 level.    The upper sacrum and upper sacroiliac joints appear normal.  The paravertebral and paraspinal soft tissues appear normal.                        Final result by Tatum Dorsey MD (09/07/23 10:48:42)                   Impression:      Postoperative changes of L4-L5 micro discectomy. There is mild adjacent endplate edema suggestive of active degenerative change.  No recurrent disc herniation. No significant canal stenosis.  There is mild-moderate bilateral foraminal narrowing at L4-5.      Electronically signed by: Tatum Dorsey MD  Date:    09/07/2023  Time:    10:48               Narrative:    EXAMINATION:  MRI LUMBAR SPINE W WO CONTRAST    CLINICAL HISTORY:  Low back pain, prior surgery, new symptoms;    TECHNIQUE:  Multiplanar, multisequence images of the Lumbar Spine were obtained with and without the use of intravenous contrast. 10 of IV Gadavist was injected.    COMPARISON:  08/12/2021 MRI lumbar    FINDINGS:  Prior L4-5 micro discectomy (08/26/2021.    Alignment: Normal lumbar lordosis is preserved.    Vertebrae:  Body heights are well maintained. No evidence for acute fracture.  There is mild adjacent endplate edema at L4-5 suggestive of active degenerative change.    Discs:  Mild disc desiccation L3-4 through L5-S1.    Cord:  Visualized conus and lumbar spinal nerve roots demonstrate normal signal.    T12-L1 through L2-3: There is no focal disc herniation.  No significant spinal canal stenosis.  No significant neural foraminal narrowing.    L3-4:  Mild disc bulge, no significant spinal canal stenosis.  No significant neural foraminal narrowing.  The facet joints appear within normal limits.    L4-5:  Previously seen disc herniation is no longer seen.  No recurrent disc herniation.  Very mild foraminal bulge bilaterally.  There is no canal stenosis.  There is mild-moderate foraminal narrowing.    L5-S1:   Mild diffuse disc bulge.  No significant spinal canal stenosis.  No significant neural foraminal narrowing.  Mild facet joint degenerative change.    No enhancing intradural mass or abnormal leptomeningeal enhancement. No intramedullary cord enhancement.  The conus terminates at L1-L2 level.    The upper sacrum and upper sacroiliac joints appear normal.  The paravertebral and paraspinal soft tissues appear normal.                                    Assessment/Plan:     * Acute exacerbation of chronic low back pain  Degenerative disc disease, lumbar  Lumbar disc herniation  Lumbar disc herniation with radiculopathy  Leg weakness  Acute exacerbation of chronic back pain with bilateral lower extremity weakness and inability to ambulate   - MRI L spine showing Postoperative changes of L4-L5 micro discectomy No recurrent disc herniation. No significant canal stenosis.  There is mild-moderate bilateral foraminal narrowing at L4-5.   - Neurosurgery consulted, appreciate recs   No urgent surgical findings present on MRI L spine  No neurosurgical intervention warranted at this time  No indication for admission to neurosurgical service at this time  Recommend MRI T spine w/o contrast for completion of workup given lower abdomen numbness.  - MM pain regimen   - 1 g tylenol q8h   - 750 robaxin QID    - 300 mg gabapentin TID    - 20 mg oxycodone moderate    - 30 mg oxycodone severe    - 2 mg IV dilaudid breakthrough    - lidocaine patch  - PT/OT consult     History of DVT (deep vein thrombosis)  S/p insertion of IVC filter   - DVT following lumbar surgery  - completed oral anticoagulant course   - IVC filter placed   - CT AP with contrast negative for thrombosis   - DVT prophylaxis while inpatient     Metabolic acidosis  - CO2 19  - monitor on CMP     Class 2 obesity with body mass index (BMI) of 39.0 to 39.9 in adult  Body mass index is 39.58 kg/m². Morbid obesity complicates all aspects of disease management from diagnostic  modalities to treatment. Weight loss encouraged and health benefits explained to patient.      VTE Risk Mitigation (From admission, onward)         Ordered     enoxaparin injection 40 mg  Daily         09/07/23 1500     IP VTE HIGH RISK PATIENT  Once         09/07/23 1500     Place sequential compression device  Until discontinued         09/07/23 1500                     On 09/07/2023, patient should be placed in hospital observation services under my care in collaboration with Yohan Chow MD.      Carolynn Kellogg PA-C  Department of Hospital Medicine  [facility] - Emergency Dept

## 2023-09-08 LAB
ALBUMIN SERPL BCP-MCNC: 3.7 G/DL (ref 3.5–5.2)
ALP SERPL-CCNC: 77 U/L (ref 55–135)
ALT SERPL W/O P-5'-P-CCNC: 15 U/L (ref 10–44)
ANION GAP SERPL CALC-SCNC: 9 MMOL/L (ref 8–16)
AST SERPL-CCNC: 12 U/L (ref 10–40)
BASOPHILS # BLD AUTO: 0.02 K/UL (ref 0–0.2)
BASOPHILS NFR BLD: 0.1 % (ref 0–1.9)
BILIRUB SERPL-MCNC: 0.3 MG/DL (ref 0.1–1)
BUN SERPL-MCNC: 25 MG/DL (ref 6–20)
CALCIUM SERPL-MCNC: 9.3 MG/DL (ref 8.7–10.5)
CHLORIDE SERPL-SCNC: 110 MMOL/L (ref 95–110)
CO2 SERPL-SCNC: 20 MMOL/L (ref 23–29)
CREAT SERPL-MCNC: 1 MG/DL (ref 0.5–1.4)
DIFFERENTIAL METHOD: ABNORMAL
EOSINOPHIL # BLD AUTO: 0.1 K/UL (ref 0–0.5)
EOSINOPHIL NFR BLD: 0.4 % (ref 0–8)
ERYTHROCYTE [DISTWIDTH] IN BLOOD BY AUTOMATED COUNT: 13.3 % (ref 11.5–14.5)
EST. GFR  (NO RACE VARIABLE): >60 ML/MIN/1.73 M^2
GLUCOSE SERPL-MCNC: 125 MG/DL (ref 70–110)
HCT VFR BLD AUTO: 42.7 % (ref 40–54)
HGB BLD-MCNC: 14.1 G/DL (ref 14–18)
IMM GRANULOCYTES # BLD AUTO: 0.09 K/UL (ref 0–0.04)
IMM GRANULOCYTES NFR BLD AUTO: 0.7 % (ref 0–0.5)
LYMPHOCYTES # BLD AUTO: 1.6 K/UL (ref 1–4.8)
LYMPHOCYTES NFR BLD: 11.6 % (ref 18–48)
MCH RBC QN AUTO: 28.5 PG (ref 27–31)
MCHC RBC AUTO-ENTMCNC: 33 G/DL (ref 32–36)
MCV RBC AUTO: 86 FL (ref 82–98)
MONOCYTES # BLD AUTO: 1.1 K/UL (ref 0.3–1)
MONOCYTES NFR BLD: 7.7 % (ref 4–15)
NEUTROPHILS # BLD AUTO: 10.9 K/UL (ref 1.8–7.7)
NEUTROPHILS NFR BLD: 79.5 % (ref 38–73)
NRBC BLD-RTO: 0 /100 WBC
PLATELET # BLD AUTO: 204 K/UL (ref 150–450)
PMV BLD AUTO: 10.8 FL (ref 9.2–12.9)
POTASSIUM SERPL-SCNC: 4 MMOL/L (ref 3.5–5.1)
PROT SERPL-MCNC: 6.6 G/DL (ref 6–8.4)
RBC # BLD AUTO: 4.94 M/UL (ref 4.6–6.2)
SODIUM SERPL-SCNC: 139 MMOL/L (ref 136–145)
WBC # BLD AUTO: 13.71 K/UL (ref 3.9–12.7)

## 2023-09-08 PROCEDURE — 63600175 PHARM REV CODE 636 W HCPCS

## 2023-09-08 PROCEDURE — 36415 COLL VENOUS BLD VENIPUNCTURE: CPT

## 2023-09-08 PROCEDURE — 25000003 PHARM REV CODE 250

## 2023-09-08 PROCEDURE — 99233 SBSQ HOSP IP/OBS HIGH 50: CPT | Mod: ,,,

## 2023-09-08 PROCEDURE — 80053 COMPREHEN METABOLIC PANEL: CPT

## 2023-09-08 PROCEDURE — G0378 HOSPITAL OBSERVATION PER HR: HCPCS

## 2023-09-08 PROCEDURE — 94761 N-INVAS EAR/PLS OXIMETRY MLT: CPT

## 2023-09-08 PROCEDURE — 85025 COMPLETE CBC W/AUTO DIFF WBC: CPT

## 2023-09-08 PROCEDURE — 97165 OT EVAL LOW COMPLEX 30 MIN: CPT

## 2023-09-08 PROCEDURE — 97530 THERAPEUTIC ACTIVITIES: CPT

## 2023-09-08 PROCEDURE — 97116 GAIT TRAINING THERAPY: CPT

## 2023-09-08 PROCEDURE — 96372 THER/PROPH/DIAG INJ SC/IM: CPT

## 2023-09-08 PROCEDURE — 97161 PT EVAL LOW COMPLEX 20 MIN: CPT

## 2023-09-08 PROCEDURE — 96376 TX/PRO/DX INJ SAME DRUG ADON: CPT

## 2023-09-08 PROCEDURE — 99233 PR SUBSEQUENT HOSPITAL CARE,LEVL III: ICD-10-PCS | Mod: ,,,

## 2023-09-08 RX ADMIN — LIDOCAINE 1 PATCH: 50 PATCH CUTANEOUS at 03:09

## 2023-09-08 RX ADMIN — ACETAMINOPHEN 1000 MG: 500 TABLET ORAL at 03:09

## 2023-09-08 RX ADMIN — ACETAMINOPHEN 1000 MG: 500 TABLET ORAL at 08:09

## 2023-09-08 RX ADMIN — OXYCODONE HYDROCHLORIDE 20 MG: 10 TABLET ORAL at 05:09

## 2023-09-08 RX ADMIN — OXYCODONE HYDROCHLORIDE 20 MG: 10 TABLET ORAL at 11:09

## 2023-09-08 RX ADMIN — GABAPENTIN 300 MG: 300 CAPSULE ORAL at 09:09

## 2023-09-08 RX ADMIN — GABAPENTIN 300 MG: 300 CAPSULE ORAL at 08:09

## 2023-09-08 RX ADMIN — HYDROMORPHONE HYDROCHLORIDE 2 MG: 1 INJECTION, SOLUTION INTRAMUSCULAR; INTRAVENOUS; SUBCUTANEOUS at 09:09

## 2023-09-08 RX ADMIN — OXYCODONE HYDROCHLORIDE 20 MG: 10 TABLET ORAL at 04:09

## 2023-09-08 RX ADMIN — GABAPENTIN 300 MG: 300 CAPSULE ORAL at 03:09

## 2023-09-08 RX ADMIN — ASPIRIN 81 MG: 81 TABLET, COATED ORAL at 09:09

## 2023-09-08 RX ADMIN — OXYCODONE HYDROCHLORIDE 25 MG: 10 TABLET ORAL at 11:09

## 2023-09-08 RX ADMIN — HYDROMORPHONE HYDROCHLORIDE 2 MG: 1 INJECTION, SOLUTION INTRAMUSCULAR; INTRAVENOUS; SUBCUTANEOUS at 07:09

## 2023-09-08 RX ADMIN — ENOXAPARIN SODIUM 40 MG: 40 INJECTION SUBCUTANEOUS at 04:09

## 2023-09-08 RX ADMIN — ACETAMINOPHEN 1000 MG: 500 TABLET ORAL at 09:09

## 2023-09-08 RX ADMIN — METHOCARBAMOL 750 MG: 750 TABLET ORAL at 09:09

## 2023-09-08 NOTE — PLAN OF CARE
Problem: Adult Inpatient Plan of Care  Goal: Plan of Care Review  Outcome: Ongoing, Progressing  Goal: Patient-Specific Goal (Individualized)  Outcome: Ongoing, Progressing  Goal: Absence of Hospital-Acquired Illness or Injury  Outcome: Ongoing, Progressing  Goal: Optimal Comfort and Wellbeing  Outcome: Ongoing, Progressing  Goal: Readiness for Transition of Care  Outcome: Ongoing, Progressing     Problem: Infection  Goal: Absence of Infection Signs and Symptoms  Outcome: Ongoing, Progressing     Problem: Bariatric Environmental Safety  Goal: Safety Maintained with Care  Outcome: Ongoing, Progressing     Problem: Fall Injury Risk  Goal: Absence of Fall and Fall-Related Injury  Outcome: Ongoing, Progressing     Problem: Pain Chronic (Persistent) (Comorbidity Management)  Goal: Acceptable Pain Control and Functional Ability  Outcome: Ongoing, Progressing     Problem: Anxiety  Goal: Anxiety Reduction or Resolution  Outcome: Ongoing, Progressing     Problem: Pain Acute  Goal: Acceptable Pain Control and Functional Ability  Outcome: Ongoing, Progressing

## 2023-09-08 NOTE — PROGRESS NOTES
[facility] - Observation 11H  Neurosurgery  Progress Note    Subjective:     History of Present Illness: 38 M [employer] work in injury, L4-5 herniated disc, s/p laser disc treatment to lumbar spine in the past year presents with BLE pain and weakness. He reports pain and numbness down the lateral and anterior thighs. He reports legs giving out while trying to walk, but no segmental or lateralizing weakness. He has lower abdominal numbness. He denies saddle anesthesia or bowel bladder changes. She denies upper extremity symptoms.       Post-Op Info:  * No surgery found *         Interval history: 9/8: pending MRI T spine    Objective:     Weight: (!) 142.1 kg (313 lb 4.4 oz)  Body mass index is 41.33 kg/m².  Vital Signs (Most Recent):  Temp: 98.8 °F (37.1 °C) (09/08/23 0837)  Pulse: 92 (09/08/23 0837)  Resp: 18 (09/08/23 0926)  BP: 136/84 (09/08/23 0837)  SpO2: 96 % (09/08/23 0837) Vital Signs (24h Range):  Temp:  [97.2 °F (36.2 °C)-98.8 °F (37.1 °C)] 98.8 °F (37.1 °C)  Pulse:  [70-98] 92  Resp:  [18-20] 18  SpO2:  [93 %-98 %] 96 %  BP: (102-158)/(45-93) 136/84                                  Physical Exam         Neurosurgery Physical Exam    Physical Exam:    Constitutional: No distress.     HEENT: atraumatic/normocephalic    Cardiovascular: Regular rhythm.     Pulm: aerating well, saturating well    Abdominal: Soft.     Psych/Behavior: He is alert.     RUE: 5/5 delt, 5/5 bi, 5/5 tri, 5/5 hg, 5/5 io  LUE: 5/5 delt, 5/5 bi, 5/5 tri, 5/5 hg, 5/5 io  RLE: 5/5 hf, 5/5 quad, 5/5 hamstring, 5/5 df, 5/5 pf  LLE: 5/5 hf, 5/5 quad, 5/5 hamstring, 5/5 df, 5/5 pf    Effort limited strength, but can overcome in all muscle groups    SILT    No hoffmans  No clonus  No babinski      Significant Labs:  Recent Labs   Lab 09/07/23  0730 09/08/23  0553   * 125*    139   K 4.4 4.0   * 110   CO2 19* 20*   BUN 19 25*   CREATININE 1.1 1.0   CALCIUM 9.5 9.3       Recent Labs   Lab 09/07/23  0730 09/08/23  0553   WBC 10.82  13.71*   HGB 14.9 14.1   HCT 44.0 42.7    204       Recent Labs   Lab 09/07/23  0730   INR 1.0   APTT 22.8       Microbiology Results (last 7 days)       ** No results found for the last 168 hours. **          All pertinent labs from the last 24 hours have been reviewed.    Significant Diagnostics:  I have reviewed all pertinent imaging results/findings within the past 24 hours.    Assessment/Plan:     Leg weakness  38 M Pmhx work in injury, L4-5 herniated disc, s/p laser disc treatment to lumbar spine in the past year presents with BLE pain and weakness.     MRI L spine reviewed, no significant central canal stenosis. Foramenal narrowing at L4-5 bilaterally.   CTH negative for acute findings     MRI L spine without findings that would explain constellation of symptoms.   No urgent surgical findings present on MRI L spine  No neurosurgical intervention warranted at this time  No indication for admission to neurosurgical service at this time  MRI T spine pending to complete workup given lower abdomen numbness        Dinorah Villarreal MD  Neurosurgery  [facility] - Observation 11H

## 2023-09-08 NOTE — SUBJECTIVE & OBJECTIVE
Interval History:  NAEON. AFVSS.  Patient evaluated at bedside, NAD. States he had a bowel movement this morning, no episodes of urinary or fecal incontinence. States he was able to stand and walk to take a shower this morning. MRI completed, NSGY signed off. Plan for early morning DC with close outpatient pain management follow up.    Review of Systems   Constitutional:  Negative for activity change, chills and fever.   HENT:  Negative for trouble swallowing.    Eyes:  Negative for photophobia and visual disturbance.   Respiratory:  Negative for chest tightness, shortness of breath and wheezing.    Cardiovascular:  Negative for chest pain, palpitations and leg swelling.   Gastrointestinal:  Negative for abdominal pain, constipation, diarrhea, nausea and vomiting.        Perianal paresthesias    Genitourinary:  Negative for difficulty urinating, dysuria, frequency, hematuria and urgency.   Musculoskeletal:  Positive for back pain (improving). Negative for arthralgias and gait problem.   Skin:  Negative for color change and rash.   Neurological:  Positive for numbness. Negative for dizziness, syncope, light-headedness and headaches.   Psychiatric/Behavioral:  Negative for agitation and confusion. The patient is not nervous/anxious.      Objective:     Vital Signs (Most Recent):  Temp: 98.7 °F (37.1 °C) (09/08/23 1615)  Pulse: 77 (09/08/23 1615)  Resp: 18 (09/08/23 1759)  BP: 139/85 (09/08/23 1615)  SpO2: (!) 93 % (09/08/23 1615) Vital Signs (24h Range):  Temp:  [97.2 °F (36.2 °C)-98.8 °F (37.1 °C)] 98.7 °F (37.1 °C)  Pulse:  [70-95] 77  Resp:  [18-20] 18  SpO2:  [93 %-98 %] 93 %  BP: (122-140)/(61-93) 139/85     Weight: (!) 142.1 kg (313 lb 4.4 oz)  Body mass index is 41.33 kg/m².  No intake or output data in the 24 hours ending 09/08/23 1859      Physical Exam  Vitals and nursing note reviewed.   Constitutional:       General: He is not in acute distress.     Appearance: He is well-developed. He is obese.   HENT:       Head: Normocephalic and atraumatic.   Eyes:      Extraocular Movements: Extraocular movements intact.   Abdominal:      General: There is no distension.   Musculoskeletal:         General: No tenderness. Normal range of motion.   Skin:     General: Skin is warm and dry.      Findings: No rash.   Neurological:      Mental Status: He is alert and oriented to person, place, and time.      Sensory: Sensory deficit (diminished sensation to BLE) present.      Motor: Motor function is intact. No weakness.   Psychiatric:         Behavior: Behavior normal.         Thought Content: Thought content normal.         Judgment: Judgment normal.       Significant Labs: All pertinent labs within the past 24 hours have been reviewed.    Significant Imaging: I have reviewed all pertinent imaging results/findings within the past 24 hours.

## 2023-09-08 NOTE — PLAN OF CARE
09/08/23 1132   Discharge Assessment   Assessment Type Discharge Planning Assessment   Confirmed/corrected address, phone number and insurance Yes   Confirmed Demographics Correct on Facesheet  (physcial address : [address] [address])   Source of Information patient   If unable to respond/provide information was family/caregiver contacted? Yes   When was your last doctors appointment?   (Megan [phone] over 6 months ago)   Does patient/caregiver understand observation status Yes   Communicated ANDREINA with patient/caregiver Date not available/Unable to determine   People in Home spouse   Facility Arrived From: [facility]   Do you expect to return to your current living situation? Yes   Do you have help at home or someone to help you manage your care at home? Yes   Who are your caregiver(s) and their phone number(s)? Kavin Sorenson [phone]   Prior to hospitilization cognitive status: Alert/Oriented   Current cognitive status: Alert/Oriented   Walking or Climbing Stairs ambulation difficulty, requires equipment   Mobility Management Rollator   Home Layout Able to live on 1st floor   Equipment Currently Used at Home cane, straight;rollator   Readmission within 30 days? No   Patient currently being followed by outpatient case management? No   Do you currently have service(s) that help you manage your care at home? No   Do you take prescription medications? Yes   Do you have prescription coverage? Yes   Coverage Medicaid/ Workers comp   Do you have any problems affording any of your prescribed medications? No   Is the patient taking medications as prescribed? yes   Who is going to help you get home at discharge? wife   How do you get to doctors appointments? family or friend will provide   Are you on dialysis? No   Do you take coumadin? No   DME Needed Upon Discharge  none   Discharge Plan discussed with: Patient   Transition of Care Barriers None   Discharge Plan A Home;Home  with family   Financial Resource Strain   How hard is it for you to pay for the very basics like food, housing, medical care, and heating? Not hard   Housing Stability   In the last 12 months, was there a time when you were not able to pay the mortgage or rent on time? N   In the last 12 months, was there a time when you did not have a steady place to sleep or slept in a shelter (including now)? N   Transportation Needs   In the past 12 months, has lack of transportation kept you from medical appointments or from getting medications? no   In the past 12 months, has lack of transportation kept you from meetings, work, or from getting things needed for daily living? No   Food Insecurity   Within the past 12 months, you worried that your food would run out before you got the money to buy more. Never true   Within the past 12 months, the food you bought just didn't last and you didn't have money to get more. Never true   Social Connections   Are you , , , , never , or living with a partner?      Wife at bedside. Patient states that this is a worker comp case. He gave CM his adjusters name and number, Hong [phone]. CM asked patient if he goes to outpatient therapy or has HH. Patient states no because he still have a infusion surgery pending for next month. Patient states that his  will transport but need a two hour notice of d/c..     CM called , Hong. He states that patient's cody BERGER is Dr. Mcdonald [phone] [facility]. Patient has a pain management MD Marco Robins in the same office as cody BERGER.  states that patient is pending infusion surgery. All information given to UR nurse and primary team . Tatum Sanchez RN

## 2023-09-08 NOTE — CARE UPDATE
No abnormal findings on MRI T spine to explain clinical condition  No NSGY intervention indicated  NSGY will sign off at this time    Dinorah cardona  NSGy PGY3

## 2023-09-08 NOTE — PT/OT/SLP EVAL
Occupational Therapy   Evaluation and Discharge Note    Name: Hugh Sorenson III  MRN: [id number]  Admitting Diagnosis: Acute exacerbation of chronic low back pain  Recent Surgery: * No surgery found *      Recommendations:     Discharge Recommendations: home  Discharge Equipment Recommendations: none  Barriers to discharge:  None    Assessment:     Hugh Sorenson III is a 38 y.o. male with a medical diagnosis of Acute exacerbation of chronic low back pain. At this time, patient is functioning at their prior level of function and does not require further acute OT services.     Plan:     During this hospitalization, patient does not require further acute OT services.  Please re-consult if situation changes.    Plan of Care Reviewed with: spouse, patient ()    Subjective     Chief Complaint: lower back pain that radiates down to LEs  Patient/Family Comments/goals: Pain relief and PLOF    Occupational Profile:  Living Environment: Pt lives with wife and 2 children in a mobile home with 4STE and unilateral HR. Pt has Tub w/o grab bars.   Previous level of function: Pt performs ADLs an functional mob with Mod (I) using  rollator  primarily and SPC for short distances. Performs ADLs (I) when pain is managed.  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 Currently drives.   Equipment Used at home: cane, straight, rollator  Assistance upon Discharge: Wife will be able to assist as needed.    Pain/Comfort:  Pain Rating 1: 6/10  Location - Orientation 1: lower  Location 1: back  Pain Addressed 1: Reposition, Distraction  Pain Rating Post-Intervention 1: 6/10    Patients cultural, spiritual, Scientology conflicts given the current situation: no    Objective:     Communicated with: SILVANA Esparza  prior to session.  Patient found HOB elevated with  (no active lines) upon OT entry to room.    General Precautions: Standard, fall  Orthopedic Precautions: spinal precautions  Braces: N/A  Respiratory Status: Room air  "    Occupational Performance:    Bed Mobility:    Patient completed Supine to Sit with supervision  Patient completed Sit to Supine with stand by assistance    Functional Mobility/Transfers:  Patient completed Sit <> Stand Transfer with stand by assistance  with  no assistive device   Functional Mobility: Pt walked ~100' with  SBA and RW.  Pt noted to WB  through UE, with Pt reporting relief in Lowr back and LE with this method.     Activities of Daily Living:  Deferred this date, Pt reports ability to complete ADLs (I) with wife at bedside endorsing.     Cognitive/Visual Perceptual:  Cognitive/Psychosocial Skills:     -       Oriented to: Person, Place, Time, and Situation   -       Follows Commands/attention:Follows multistep  commands  -       Communication: clear/fluent  -       Safety awareness/insight to disability: intact   -       Mood/Affect/Coping skills/emotional control: Appropriate to situation, Cooperative, and Pleasant    Physical Exam:  Dominant hand: -       R  Upper Extremity Range of Motion:  -       Right Upper Extremity: WNL  -       Left Upper Extremity: WNL  Upper Extremity Strength: -       Right Upper Extremity: WFL  -       Left Upper Extremity: WFL   Strength: -       Right Upper Extremity: WNL  -       Left Upper Extremity: WNL    AMPAC 6 Click ADL:  AMPAC Total Score: 22    Treatment & Education:  Role of OT and D/C  Educated on OOB activity with staff A and impact on preventing functional decline         Patient left HOB elevated with  RN notified and wife and  present    GOALS:   Multidisciplinary Problems       Occupational Therapy Goals       Not on file                    History:     Past Medical History:   Diagnosis Date    Anxiety     Back injury     "years ago         Past Surgical History:   Procedure Laterality Date    BACK SURGERY      HERNIA REPAIR      MINIMALLY INVASIVE SURGICAL REMOVAL OF INTERVERTEBRAL DISC OF SPINE USING MICROSCOPE N/A 08/26/2021    Procedure: " DISCECTOMY, SPINE, MINIMALLY INVASIVE, USING MICROSCOPE; L4-5; 3hrs duration; C-arm; METRx; Jelani flat with tabitha frame;;  Surgeon: Sage Burgess MD;  Location: [facility] MAIN OR;  Service: Neurosurgery;  Laterality: N/A;       Time Tracking:     OT Date of Treatment: 09/08/23  OT Start Time: 1402  OT Stop Time: 1423  OT Total Time (min): 21 min    Billable Minutes:Evaluation 10  Therapeutic Activity 11    9/8/2023

## 2023-09-08 NOTE — PT/OT/SLP EVAL
"Physical Therapy Evaluation  Co-evaluation with OT due to acuity of condition, level of skilled assist needed for assessment of safety with mobility.   Patient Name:  Hugh Sorenson III   MRN:  [id number]    Recommendations:     Discharge Recommendations: home   Discharge Equipment Recommendations: none   Barriers to discharge: None    Assessment:     Hugh Sorenson III is a 38 y.o. male admitted with a medical diagnosis of Acute exacerbation of chronic low back pain.  He presents with the following impairments/functional limitations: weakness, impaired endurance, impaired sensation, gait instability, impaired functional mobility, decreased lower extremity function, pain. The patient reports JASON LE sensation deficits that are exacerbated this admission, impaired sensation to light touch and temperature JASON LE, chronic LE weakness. Patient planned for spinal fusion surgery in October.     Rehab Prognosis: Good; patient would benefit from acute skilled PT services to address these deficits and reach maximum level of function.    Recent Surgery: * No surgery found *      Plan:     During this hospitalization, patient to be seen 3 x/week to address the identified rehab impairments via gait training, therapeutic activities, therapeutic exercises, neuromuscular re-education and progress toward the following goals:    Plan of Care Expires:  10/08/23    Subjective     Chief Complaint: "My legs are feeling better, but my sensation is still off, my feet are feeling hot and cold", "When I push through my walker, I can feel my back release and I feel like I could run:  Patient/Family Comments/goals: return to PLOF  Pain/Comfort:  Pain Rating 1: 6/10  Location - Orientation 1: lower  Location 1: back  Pain Addressed 1: Reposition, Distraction  Pain Rating Post-Intervention 1: 6/10    Patients cultural, spiritual, Sikhism conflicts given the current situation: no    Living Environment:  The renay lives with his wife and " 2 kids in a mobile home, 4 VIOLETTE with unilateral HR, tub.   Prior to admission, patients level of function was using rollator for gait, SPC for short distances.  Equipment used at home: cane, straight, rollator.  DME owned (not currently used): none.  Upon discharge, patient will have assistance from wife.    Objective:     Communicated with RN prior to session.  Patient found HOB elevated with  (NA)  upon PT entry to room.    General Precautions: Standard, fall  Orthopedic Precautions:spinal precautions   Braces: N/A  Respiratory Status: Room air    Exams:    Cognitive Exam  Patient is A&O x4 and follows 100% of one -step commands    Fine Motor Coordination   -       WNL     Postural Exam Patient presented with the following abnormalities:    -       Rounded shoulders  -       Forward head  -       Kyphosis  -       Posterior pelvic tilt  -         Sensation    -       Light touch diminished light touch, diminished temperature sensation; sensation impairments worse R vs L LE   Skin Integrity/Edema     -       Skin integrity: visibly intact  -       Edema: Mild JASON LE   R LE ROM WNL   R LE Strength 3-/5 hip flexion,  4/5 knee ext/flex, and ankle DF/PF; neural tensioning symptoms with MMT of LE   L LE ROM WNL   L LE Strength  3-/5 hip flexion,  4-/5 knee ext/flex, and ankle DF/PF; neural tensioning symptoms with MMT of LE       Functional Mobility:    Bed Mobility  Supine to Sit on the R side: supervision assistance ,  long sit to pivot EOB  Sit to supine: stand by assistance    Transfers Sit to Stand:  stand by assistance    Gait  Gait Distance: 100 ft with RW  Assistance Level: stand by assistance   Description: Wbing heavily through UE to traction back , short shuffling steps, step to pattern with R LE leading           AM-PAC 6 CLICK MOBILITY  Total Score:23       Treatment & Education:  Patient and family educated on:  -role of therapy  -goals of session  -PT POC  -benefits of out of bed mobility and consequences  "of immobility  -calling for staff assist to mobilize safely  Patient agreeable to mobilize with therapy.      Gait training: cued for upright posture, reciprocal strides, cued to ambulate inside RW CHRISTIAN, pacing for energy conservation     Discussed spinal precautions, use of self tractioning to alleviate radiating LE pain.     Patient encouraged to ambulate, sit up in chair 3x/day to prevent deconditioning during hospitalization. Patient verbalized understanding and agreement to mobilize only with RN assist for safety.     Patient safe to ambulate with RN assist using RW.     Patient left HOB elevated with all lines intact and call button in reach.    GOALS:   Multidisciplinary Problems       Physical Therapy Goals          Problem: Physical Therapy    Goal Priority Disciplines Outcome Goal Variances Interventions   Physical Therapy Goal     PT, PT/OT Ongoing, Progressing     Description: Goals to be met by:      Patient will increase functional independence with mobility by performin. Sit to stand transfer with Modified [place]  2. Gait  x 200 feet with Modified [place] using LRAD.   3. Ascend/descend 4 stair with right Handrails Supervision using LRAD.                          History:     Past Medical History:   Diagnosis Date    Anxiety     Back injury     "years ago       Past Surgical History:   Procedure Laterality Date    BACK SURGERY      HERNIA REPAIR      MINIMALLY INVASIVE SURGICAL REMOVAL OF INTERVERTEBRAL DISC OF SPINE USING MICROSCOPE N/A 2021    Procedure: DISCECTOMY, SPINE, MINIMALLY INVASIVE, USING MICROSCOPE; L4-5; 3hrs duration; C-arm; METRx; Jelani flat with tabitha frame;;  Surgeon: Sage Burgess MD;  Location: [facility] MAIN OR;  Service: Neurosurgery;  Laterality: N/A;       Time Tracking:     PT Received On: 23  PT Start Time: 1400     PT Stop Time: 1423  PT Total Time (min): 23 min     Billable Minutes: Evaluation 10 and Gait Training 13      2023  "

## 2023-09-08 NOTE — PLAN OF CARE
Problem: Physical Therapy  Goal: Physical Therapy Goal  Description: Goals to be met by:      Patient will increase functional independence with mobility by performin. Sit to stand transfer with Modified [place]  2. Gait  x 200 feet with Modified [place] using LRAD.   3. Ascend/descend 4 stair with right Handrails Supervision using LRAD.     Outcome: Ongoing, Progressing   Evaluation completed, initiated plan of care.   Aby Castro, PT  2023

## 2023-09-08 NOTE — ASSESSMENT & PLAN NOTE
38 M [employer] work in injury, L4-5 herniated disc, s/p laser disc treatment to lumbar spine in the past year presents with BLE pain and weakness.     MRI L spine reviewed, no significant central canal stenosis. Foramenal narrowing at L4-5 bilaterally.   CTH negative for acute findings     MRI L spine without findings that would explain constellation of symptoms.   No urgent surgical findings present on MRI L spine  No neurosurgical intervention warranted at this time  No indication for admission to neurosurgical service at this time  MRI T spine pending to complete workup given lower abdomen numbness

## 2023-09-08 NOTE — SUBJECTIVE & OBJECTIVE
Interval history: 9/8: pending MRI T spine    Objective:     Weight: (!) 142.1 kg (313 lb 4.4 oz)  Body mass index is 41.33 kg/m².  Vital Signs (Most Recent):  Temp: 98.8 °F (37.1 °C) (09/08/23 0837)  Pulse: 92 (09/08/23 0837)  Resp: 18 (09/08/23 0926)  BP: 136/84 (09/08/23 0837)  SpO2: 96 % (09/08/23 0837) Vital Signs (24h Range):  Temp:  [97.2 °F (36.2 °C)-98.8 °F (37.1 °C)] 98.8 °F (37.1 °C)  Pulse:  [70-98] 92  Resp:  [18-20] 18  SpO2:  [93 %-98 %] 96 %  BP: (102-158)/(45-93) 136/84                                   Physical Exam         Neurosurgery Physical Exam    Physical Exam:    Constitutional: No distress.     HEENT: atraumatic/normocephalic    Cardiovascular: Regular rhythm.     Pulm: aerating well, saturating well    Abdominal: Soft.     Psych/Behavior: He is alert.     RUE: 5/5 delt, 5/5 bi, 5/5 tri, 5/5 hg, 5/5 io  LUE: 5/5 delt, 5/5 bi, 5/5 tri, 5/5 hg, 5/5 io  RLE: 5/5 hf, 5/5 quad, 5/5 hamstring, 5/5 df, 5/5 pf  LLE: 5/5 hf, 5/5 quad, 5/5 hamstring, 5/5 df, 5/5 pf    Effort limited strength, but can overcome in all muscle groups    SILT    No hoffmans  No clonus  No babinski      Significant Labs:  Recent Labs   Lab 09/07/23  0730 09/08/23  0553   * 125*    139   K 4.4 4.0   * 110   CO2 19* 20*   BUN 19 25*   CREATININE 1.1 1.0   CALCIUM 9.5 9.3       Recent Labs   Lab 09/07/23  0730 09/08/23  0553   WBC 10.82 13.71*   HGB 14.9 14.1   HCT 44.0 42.7    204       Recent Labs   Lab 09/07/23  0730   INR 1.0   APTT 22.8       Microbiology Results (last 7 days)       ** No results found for the last 168 hours. **          All pertinent labs from the last 24 hours have been reviewed.    Significant Diagnostics:  I have reviewed all pertinent imaging results/findings within the past 24 hours.

## 2023-09-08 NOTE — NURSING
PT ARRIVED TO UNIT VIA BED ACCOMPANIED BY TRANSPORTER AND FAMILY.  AA+OX4.  RESP EVEN AND NON LABORED.  DENIES CHEST PAIN OR SOB.  SAFETY PRECAUTIONS IMPLEMENTED.  NO DISTRESS NOTED.

## 2023-09-09 VITALS
TEMPERATURE: 98 F | BODY MASS INDEX: 41.52 KG/M2 | OXYGEN SATURATION: 100 % | SYSTOLIC BLOOD PRESSURE: 135 MMHG | HEIGHT: 73 IN | DIASTOLIC BLOOD PRESSURE: 79 MMHG | HEART RATE: 55 BPM | RESPIRATION RATE: 16 BRPM | WEIGHT: 313.25 LBS

## 2023-09-09 LAB
ALBUMIN SERPL BCP-MCNC: 3.5 G/DL (ref 3.5–5.2)
ALP SERPL-CCNC: 76 U/L (ref 55–135)
ALT SERPL W/O P-5'-P-CCNC: 16 U/L (ref 10–44)
ANION GAP SERPL CALC-SCNC: 11 MMOL/L (ref 8–16)
AST SERPL-CCNC: 11 U/L (ref 10–40)
BASOPHILS # BLD AUTO: 0.06 K/UL (ref 0–0.2)
BASOPHILS NFR BLD: 0.7 % (ref 0–1.9)
BILIRUB SERPL-MCNC: 0.2 MG/DL (ref 0.1–1)
BUN SERPL-MCNC: 22 MG/DL (ref 6–20)
CALCIUM SERPL-MCNC: 8.7 MG/DL (ref 8.7–10.5)
CHLORIDE SERPL-SCNC: 112 MMOL/L (ref 95–110)
CO2 SERPL-SCNC: 19 MMOL/L (ref 23–29)
CREAT SERPL-MCNC: 1.1 MG/DL (ref 0.5–1.4)
DIFFERENTIAL METHOD: ABNORMAL
EOSINOPHIL # BLD AUTO: 0.2 K/UL (ref 0–0.5)
EOSINOPHIL NFR BLD: 2.3 % (ref 0–8)
ERYTHROCYTE [DISTWIDTH] IN BLOOD BY AUTOMATED COUNT: 13.7 % (ref 11.5–14.5)
EST. GFR  (NO RACE VARIABLE): >60 ML/MIN/1.73 M^2
GLUCOSE SERPL-MCNC: 86 MG/DL (ref 70–110)
HCT VFR BLD AUTO: 39.8 % (ref 40–54)
HGB BLD-MCNC: 13.3 G/DL (ref 14–18)
IMM GRANULOCYTES # BLD AUTO: 0.05 K/UL (ref 0–0.04)
IMM GRANULOCYTES NFR BLD AUTO: 0.6 % (ref 0–0.5)
LYMPHOCYTES # BLD AUTO: 2.9 K/UL (ref 1–4.8)
LYMPHOCYTES NFR BLD: 32.4 % (ref 18–48)
MCH RBC QN AUTO: 28.7 PG (ref 27–31)
MCHC RBC AUTO-ENTMCNC: 33.4 G/DL (ref 32–36)
MCV RBC AUTO: 86 FL (ref 82–98)
MONOCYTES # BLD AUTO: 0.8 K/UL (ref 0.3–1)
MONOCYTES NFR BLD: 9.1 % (ref 4–15)
NEUTROPHILS # BLD AUTO: 5 K/UL (ref 1.8–7.7)
NEUTROPHILS NFR BLD: 54.9 % (ref 38–73)
NRBC BLD-RTO: 0 /100 WBC
PLATELET # BLD AUTO: 183 K/UL (ref 150–450)
PMV BLD AUTO: 10.8 FL (ref 9.2–12.9)
POTASSIUM SERPL-SCNC: 4.1 MMOL/L (ref 3.5–5.1)
PROT SERPL-MCNC: 6.3 G/DL (ref 6–8.4)
RBC # BLD AUTO: 4.63 M/UL (ref 4.6–6.2)
SODIUM SERPL-SCNC: 142 MMOL/L (ref 136–145)
WBC # BLD AUTO: 9.02 K/UL (ref 3.9–12.7)

## 2023-09-09 PROCEDURE — 63600175 PHARM REV CODE 636 W HCPCS

## 2023-09-09 PROCEDURE — 99239 PR HOSPITAL DISCHARGE DAY,>30 MIN: ICD-10-PCS | Mod: ,,,

## 2023-09-09 PROCEDURE — 80053 COMPREHEN METABOLIC PANEL: CPT

## 2023-09-09 PROCEDURE — 25000003 PHARM REV CODE 250

## 2023-09-09 PROCEDURE — 99239 HOSP IP/OBS DSCHRG MGMT >30: CPT | Mod: ,,,

## 2023-09-09 PROCEDURE — G0378 HOSPITAL OBSERVATION PER HR: HCPCS

## 2023-09-09 PROCEDURE — 85025 COMPLETE CBC W/AUTO DIFF WBC: CPT

## 2023-09-09 PROCEDURE — 96376 TX/PRO/DX INJ SAME DRUG ADON: CPT

## 2023-09-09 PROCEDURE — 36415 COLL VENOUS BLD VENIPUNCTURE: CPT

## 2023-09-09 RX ORDER — GABAPENTIN 300 MG/1
300 CAPSULE ORAL 3 TIMES DAILY
Qty: 90 CAPSULE | Refills: 0 | Status: SHIPPED | OUTPATIENT
Start: 2023-09-09 | End: 2023-10-09

## 2023-09-09 RX ORDER — METHOCARBAMOL 750 MG/1
750 TABLET, FILM COATED ORAL 4 TIMES DAILY
Qty: 40 TABLET | Refills: 0 | Status: SHIPPED | OUTPATIENT
Start: 2023-09-09 | End: 2023-09-19

## 2023-09-09 RX ORDER — LIDOCAINE 50 MG/G
1 PATCH TOPICAL DAILY
Qty: 10 PATCH | Refills: 0 | Status: SHIPPED | OUTPATIENT
Start: 2023-09-09 | End: 2023-09-19

## 2023-09-09 RX ADMIN — ACETAMINOPHEN 1000 MG: 500 TABLET ORAL at 08:09

## 2023-09-09 RX ADMIN — ASPIRIN 81 MG: 81 TABLET, COATED ORAL at 08:09

## 2023-09-09 RX ADMIN — GABAPENTIN 300 MG: 300 CAPSULE ORAL at 08:09

## 2023-09-09 RX ADMIN — OXYCODONE HYDROCHLORIDE 25 MG: 10 TABLET ORAL at 08:09

## 2023-09-09 RX ADMIN — POLYETHYLENE GLYCOL 3350 17 G: 17 POWDER, FOR SOLUTION ORAL at 01:09

## 2023-09-09 RX ADMIN — HYDROMORPHONE HYDROCHLORIDE 2 MG: 1 INJECTION, SOLUTION INTRAMUSCULAR; INTRAVENOUS; SUBCUTANEOUS at 02:09

## 2023-09-09 NOTE — ASSESSMENT & PLAN NOTE
Body mass index is 41.33 kg/m². Morbid obesity complicates all aspects of disease management from diagnostic modalities to treatment. Weight loss encouraged and health benefits explained to patient.

## 2023-09-09 NOTE — PROGRESS NOTES
[facility] [address] [place] [address] Medicine  Progress Note    Patient Name: Hugh Sorenson III  MRN: [id number]  Patient Class: OP- Observation   Admission Date: 9/7/2023  Length of Stay: 0 days  Attending Physician: Yohan Chow MD  Primary Care Provider: Guerita, Primary Doctor        Subjective:     Principal Problem:Acute exacerbation of chronic low back pain        HPI:  Hugh Sorenson III is a 38 y.o. male with chronic lumbar back pain s/p L4-L5 discectomy and prior DVT s/p IVC and completion of OAC being admitted to hospital medicine as a transfer for acute on chronic back pain and lower extremity weakness. States he took a bath then got up to use the restroom when he began to have worsening lumbar back pain and weakness. He was able to get into his car and drive himself to the emergency room. No urinary or fecal incontinence, but does endorse some perianal paraesthesias. He is followed by pain management as an outpatient and is on oxycodone 20mg but states that between their home and the emergency room, his wife misplaced his medications. Denies nausea, vomiting, fever, CP or SOB. He was transferred to [facility] for neurosurgery consulted.     In ED: AFVSS. CBC and CMP grossly unremarkable. UA non-infectious. CT AP without convincing thrombus nor mass identified. MRI lumbar spine showing postoperative changes of L4-L5 micro discectomy. No recurrent disc herniation. No significant canal stenosis. There is mild-moderate bilateral foraminal narrowing at L4-5. Neurosurgery consulted, no indication for surgical intervention. Recommended MRI T spine which is pending. Given 4 mg IV morphine x2 without relief. Admitted to hospital medicine for further management.       Overview/Hospital Course:  Mr. Sorenson was placed in observation for acute on chronic low back pain as described above. MRI spine without findings to correlate with symptoms. Neurosurgery was consulted and signed off. PT/OT recommended  home without therapy needs. Plan to continue multimodal pain regimen and discharge in the morning with close pain management follow-up.       Interval History:  NAEON. AFVSS.  Patient evaluated at bedside, NAD. States he had a bowel movement this morning, no episodes of urinary or fecal incontinence. States he was able to stand and walk to take a shower this morning. MRI completed, NSGY signed off. Plan for early morning DC with close outpatient pain management follow up.    Review of Systems   Constitutional:  Negative for activity change, chills and fever.   HENT:  Negative for trouble swallowing.    Eyes:  Negative for photophobia and visual disturbance.   Respiratory:  Negative for chest tightness, shortness of breath and wheezing.    Cardiovascular:  Negative for chest pain, palpitations and leg swelling.   Gastrointestinal:  Negative for abdominal pain, constipation, diarrhea, nausea and vomiting.        Perianal paresthesias    Genitourinary:  Negative for difficulty urinating, dysuria, frequency, hematuria and urgency.   Musculoskeletal:  Positive for back pain (improving). Negative for arthralgias and gait problem.   Skin:  Negative for color change and rash.   Neurological:  Positive for numbness. Negative for dizziness, syncope, light-headedness and headaches.   Psychiatric/Behavioral:  Negative for agitation and confusion. The patient is not nervous/anxious.      Objective:     Vital Signs (Most Recent):  Temp: 98.7 °F (37.1 °C) (09/08/23 1615)  Pulse: 77 (09/08/23 1615)  Resp: 18 (09/08/23 1759)  BP: 139/85 (09/08/23 1615)  SpO2: (!) 93 % (09/08/23 1615) Vital Signs (24h Range):  Temp:  [97.2 °F (36.2 °C)-98.8 °F (37.1 °C)] 98.7 °F (37.1 °C)  Pulse:  [70-95] 77  Resp:  [18-20] 18  SpO2:  [93 %-98 %] 93 %  BP: (122-140)/(61-93) 139/85     Weight: (!) 142.1 kg (313 lb 4.4 oz)  Body mass index is 41.33 kg/m².  No intake or output data in the 24 hours ending 09/08/23 1859      Physical Exam  Vitals and  nursing note reviewed.   Constitutional:       General: He is not in acute distress.     Appearance: He is well-developed. He is obese.   HENT:      Head: Normocephalic and atraumatic.   Eyes:      Extraocular Movements: Extraocular movements intact.   Abdominal:      General: There is no distension.   Musculoskeletal:         General: No tenderness. Normal range of motion.   Skin:     General: Skin is warm and dry.      Findings: No rash.   Neurological:      Mental Status: He is alert and oriented to person, place, and time.      Sensory: Sensory deficit (diminished sensation to BLE) present.      Motor: Motor function is intact. No weakness.   Psychiatric:         Behavior: Behavior normal.         Thought Content: Thought content normal.         Judgment: Judgment normal.       Significant Labs: All pertinent labs within the past 24 hours have been reviewed.    Significant Imaging: I have reviewed all pertinent imaging results/findings within the past 24 hours.      Assessment/Plan:      * Acute exacerbation of chronic low back pain  Degenerative disc disease, lumbar  Lumbar disc herniation  Lumbar disc herniation with radiculopathy  Leg weakness  Acute exacerbation of chronic back pain with bilateral lower extremity weakness and inability to ambulate   - MRI L spine showing Postoperative changes of L4-L5 micro discectomy No recurrent disc herniation. No significant canal stenosis.  There is mild-moderate bilateral foraminal narrowing at L4-5.   - Neurosurgery consulted, appreciate recs    No abnormal findings on MRI T spine to explain clinical condition   No NSGY intervention indicated, will sign off   - MM pain regimen   - 1 g tylenol q8h   - 750 robaxin QID    - 300 mg gabapentin TID    - 20 mg oxycodone moderate    - 25 mg oxycodone severe    - 2 mg IV dilaudid breakthrough    - lidocaine patch  - PT/OT consulted, recommend home with no outpatient     History of DVT (deep vein thrombosis)  S/p insertion of  IVC filter   - DVT following lumbar surgery  - completed oral anticoagulant course   - IVC filter placed   - CT AP with contrast negative for thrombosis   - DVT prophylaxis while inpatient     Metabolic acidosis  - CO2 20  - monitor on CMP     Class 2 obesity with body mass index (BMI) of 39.0 to 39.9 in adult  Body mass index is 41.33 kg/m². Morbid obesity complicates all aspects of disease management from diagnostic modalities to treatment. Weight loss encouraged and health benefits explained to patient.      VTE Risk Mitigation (From admission, onward)         Ordered     enoxaparin injection 40 mg  Daily         09/07/23 1500     IP VTE HIGH RISK PATIENT  Once         09/07/23 1500     Place sequential compression device  Until discontinued         09/07/23 1500                Discharge Planning   ANDREINA: 9/9/2023     Code Status: Full Code   Is the patient medically ready for discharge?: No    Reason for patient still in hospital (select all that apply): Patient trending condition and Treatment  Discharge Plan A: Home, Home with family                  Carolynn Kellogg PA-C  Department of Hospital Medicine   [facility]

## 2023-09-09 NOTE — PLAN OF CARE
[facility]  Discharge Final Note    Primary Care Provider: No, Primary Doctor    Expected Discharge Date: 9/9/2023    Final Discharge Note (most recent)       Final Note - 09/09/23 1258          Final Note    Assessment Type Final Discharge Note     Anticipated Discharge Disposition Home or Self Care     Hospital Resources/Appts/Education Provided Provided patient/caregiver with written discharge plan information        Post-Acute Status    Post-Acute Authorization Other     Coverage Medicaid / Workers Comp     Other Status No Post-Acute Service Needs     Discharge Delays None known at this time                     Important Message from Medicare             Contact Info       Marco Prescott MD   Specialty: Pain Medicine, Sports Medicine    [address] [address] [facility]  [facility] [address]   Phone: [phone]       Next Steps: Follow up          FELICIA Joshua, LCSW  Weekend -[facility]  [facility] [phone]

## 2023-09-09 NOTE — HOSPITAL COURSE
Mr. Sorenson was placed in observation for acute on chronic low back pain as described above. MRI spine without findings to correlate with symptoms. Neurosurgery was consulted and signed off. PT/OT recommended home without therapy needs. Prescribed robaxin and gabapentin for pain control, did not prescribe opioids as patient is in pain management. Advised to follow up closely with his outpatient spine specialist.       On day of discharge patient's vital signs were stable and patient appeared clinically ready for discharge. Hospital course, discharge plan and return precautions discussed - patient expressed understanding. All questions answered at that time.

## 2023-09-09 NOTE — ASSESSMENT & PLAN NOTE
Degenerative disc disease, lumbar  Lumbar disc herniation  Lumbar disc herniation with radiculopathy  Leg weakness  Acute exacerbation of chronic back pain with bilateral lower extremity weakness and inability to ambulate   - MRI L spine showing Postoperative changes of L4-L5 micro discectomy No recurrent disc herniation. No significant canal stenosis.  There is mild-moderate bilateral foraminal narrowing at L4-5.   - Neurosurgery consulted, appreciate recs    No abnormal findings on MRI T spine to explain clinical condition   No NSGY intervention indicated, will sign off   - MM pain regimen   - 1 g tylenol q8h   - 750 robaxin QID    - 300 mg gabapentin TID    - 20 mg oxycodone moderate    - 25 mg oxycodone severe    - 2 mg IV dilaudid breakthrough    - lidocaine patch  - PT/OT consulted, recommend home with no outpatient

## 2023-09-11 NOTE — DISCHARGE SUMMARY
[facility] [address] [place] [address] Medicine  Discharge Summary      Patient Name: Hugh Sorenson III  MRN: [id number]  GURINDER: [id number]  Patient Class: OP- Observation  Admission Date: 9/7/2023  Hospital Length of Stay: 0 days  Discharge Date and Time: 9/9/2023 11:43 AM  Attending Physician: Guerita att. providers found   Discharging Provider: Carolynn Kellogg PA-C  Primary Care Provider: Guerita Primary Doctor  Hospital Medicine Team: [facility] Carolynn Kellogg PA-C  Primary Care Team: [facility]    HPI:   Hugh Sorenson III is a 38 y.o. male with chronic lumbar back pain s/p L4-L5 discectomy and prior DVT s/p IVC and completion of OAC being admitted to hospital medicine as a transfer for acute on chronic back pain and lower extremity weakness. States he took a bath then got up to use the restroom when he began to have worsening lumbar back pain and weakness. He was able to get into his car and drive himself to the emergency room. No urinary or fecal incontinence, but does endorse some perianal paraesthesias. He is followed by pain management as an outpatient and is on oxycodone 20mg but states that between their home and the emergency room, his wife misplaced his medications. Denies nausea, vomiting, fever, CP or SOB. He was transferred to [facility] for neurosurgery consulted.     In ED: AFVSS. CBC and CMP grossly unremarkable. UA non-infectious. CT AP without convincing thrombus nor mass identified. MRI lumbar spine showing postoperative changes of L4-L5 micro discectomy. No recurrent disc herniation. No significant canal stenosis. There is mild-moderate bilateral foraminal narrowing at L4-5. Neurosurgery consulted, no indication for surgical intervention. Recommended MRI T spine which is pending. Given 4 mg IV morphine x2 without relief. Admitted to hospital medicine for further management.       * No surgery found *      Hospital Course:   Mr. Sorenson was placed in observation for acute on  chronic low back pain as described above. MRI spine without findings to correlate with symptoms. Neurosurgery was consulted and signed off. PT/OT recommended home without therapy needs. Prescribed robaxin and gabapentin for pain control, did not prescribe opioids as patient is in pain management. Advised to follow up closely with his outpatient spine specialist.       On day of discharge patient's vital signs were stable and patient appeared clinically ready for discharge. Hospital course, discharge plan and return precautions discussed - patient expressed understanding. All questions answered at that time.        Goals of Care Treatment Preferences:  Code Status: Full Code      Consults:   Consults (From admission, onward)        Status Ordering Provider     Inpatient consult to Neurosurgery  Once        Provider:  (Not yet assigned)    Completed SANJAY MCCABE          No new Assessment & Plan notes have been filed under this hospital service since the last note was generated.  Service: Hospital Medicine    Final Active Diagnoses:    Diagnosis Date Noted POA    PRINCIPAL PROBLEM:  Acute exacerbation of chronic low back pain [M54.50, G89.29] 09/07/2023 Yes    History of DVT (deep vein thrombosis) [Z86.718] 09/07/2023 Not Applicable    Leg weakness [R29.898] 09/07/2023 Yes    Class 2 obesity with body mass index (BMI) of 39.0 to 39.9 in adult [E66.9, Z68.39] 09/07/2023 Not Applicable    Metabolic acidosis [E87.20] 09/07/2023 Yes    S/P insertion of IVC (inferior vena caval) filter [Z95.828] 09/07/2023 Not Applicable    Degenerative disc disease, lumbar [M51.36] 11/29/2021 Yes    Lumbar disc herniation [M51.26] 08/26/2021 Yes    Lumbar disc herniation with radiculopathy [M51.16] 08/25/2021 Yes      Problems Resolved During this Admission:       Discharged Condition: good    Disposition: Home or Self Care    Follow Up:   Follow-up Information     Marco Prescott MD Follow up.    Specialties: Pain  Medicine, Sports Medicine  Contact information:  [address] [address] [facility]  [place] [address]  [phone]                       Patient Instructions:      Diet Adult Regular     Notify your health care provider if you experience any of the following:  temperature >100.4     Notify your health care provider if you experience any of the following:  persistent nausea and vomiting or diarrhea     Notify your health care provider if you experience any of the following:  severe uncontrolled pain     Notify your health care provider if you experience any of the following:  difficulty breathing or increased cough     Notify your health care provider if you experience any of the following:  severe persistent headache     Notify your health care provider if you experience any of the following:  worsening rash     Notify your health care provider if you experience any of the following:  persistent dizziness, light-headedness, or visual disturbances     Notify your health care provider if you experience any of the following:  increased confusion or weakness     Activity as tolerated       Significant Diagnostic Studies:   Lab Results   Component Value Date    WBC 9.02 09/09/2023    HGB 13.3 (L) 09/09/2023    HCT 39.8 (L) 09/09/2023    MCV 86 09/09/2023     09/09/2023       BMP  Lab Results   Component Value Date     09/09/2023    K 4.1 09/09/2023     (H) 09/09/2023    CO2 19 (L) 09/09/2023    BUN 22 (H) 09/09/2023    CREATININE 1.1 09/09/2023    CALCIUM 8.7 09/09/2023    ANIONGAP 11 09/09/2023    EGFRNORACEVR >60.0 09/09/2023     Imaging Results          MRI Thoracic Spine Without Contrast (Final result)  Result time 09/08/23 12:42:13    Final result by Amador John MD (09/08/23 12:42:13)                 Impression:      1. No MR abnormalities to account for reported history of lower extremity weakness.  2. Mild thoracic spondylosis without associated spinal canal stenosis or  neural foraminal narrowing.      Electronically signed by: Amador John MD  Date:    09/08/2023  Time:    12:42             Narrative:    EXAMINATION:  MRI THORACIC SPINE WITHOUT CONTRAST    CLINICAL HISTORY:  lower extremity weakness;    TECHNIQUE:  Multiplanar, multisequence images were performed through the thoracic spine.  Contrast was not administered.    COMPARISON:  Lumbar spine MRI 09/07/2023, radiograph 07/30/2012.    FINDINGS:  Thoracic spine alignment appears within normal limits.  No spondylolisthesis.  Vertebral body heights are well maintained without evidence for fracture.  No marrow signal abnormality to suggest an infiltrative process.    Multilevel degenerative disc desiccation with mild associated height loss from T5-6 through T7-T8 and at T11-T12.  No endplate edema.    Thoracic spinal cord demonstrates normal contour and signal intensity.    Limited evaluation of the visualized thoracic and upper abdominal organs demonstrates no significant abnormalities.  Paraspinal musculature demonstrates normal bulk and signal intensity.    Small posterior disc bulges at T6-T7 and T7-T8.  No focal disc protrusion or extrusion.  No spinal canal stenosis or neural foraminal narrowing.  Bilateral ligamentum flavum hypertrophy at T10-T11.  Left facet arthropathy at T11-T12.                    Final result by Amador John MD (09/08/23 12:42:13)                 Impression:      1. No MR abnormalities to account for reported history of lower extremity weakness.  2. Mild thoracic spondylosis without associated spinal canal stenosis or neural foraminal narrowing.      Electronically signed by: Amador John MD  Date:    09/08/2023  Time:    12:42             Narrative:    EXAMINATION:  MRI THORACIC SPINE WITHOUT CONTRAST    CLINICAL HISTORY:  lower extremity weakness;    TECHNIQUE:  Multiplanar, multisequence images were performed through the thoracic spine.  Contrast was not  administered.    COMPARISON:  Lumbar spine MRI 09/07/2023, radiograph 07/30/2012.    FINDINGS:  Thoracic spine alignment appears within normal limits.  No spondylolisthesis.  Vertebral body heights are well maintained without evidence for fracture.  No marrow signal abnormality to suggest an infiltrative process.    Multilevel degenerative disc desiccation with mild associated height loss from T5-6 through T7-T8 and at T11-T12.  No endplate edema.    Thoracic spinal cord demonstrates normal contour and signal intensity.    Limited evaluation of the visualized thoracic and upper abdominal organs demonstrates no significant abnormalities.  Paraspinal musculature demonstrates normal bulk and signal intensity.    Small posterior disc bulges at T6-T7 and T7-T8.  No focal disc protrusion or extrusion.  No spinal canal stenosis or neural foraminal narrowing.  Bilateral ligamentum flavum hypertrophy at T10-T11.  Left facet arthropathy at T11-T12.                               CT Head Without Contrast (Final result)  Result time 09/07/23 21:57:08    Final result by Darryl Garduno MD (09/07/23 21:57:08)                 Impression:      No acute intracranial abnormalities.    Additional findings as above.      Electronically signed by: Darryl Garduno MD  Date:    09/07/2023  Time:    21:57             Narrative:    EXAMINATION:  CT HEAD WITHOUT CONTRAST    CLINICAL HISTORY:  right eye lid weak and right upper check with diminished sensation. Unclear onset. No other focal deficits.;    TECHNIQUE:  Low dose axial images were obtained through the head.  Coronal and sagittal reformations were also performed. Contrast was not administered.    COMPARISON:  07/27/2023.    FINDINGS:  The brain parenchyma appears normal for age with good corticomedullary differentiation.  There is no evidence of acute infarct, hemorrhage, or mass.  The ventricular system is normal in size.  No mass-effect or midline shift.  There are no abnormal  extra-axial fluid collections.  Mild mucosal thickening in the ethmoid sinuses, similar to prior.  Partially visual small retention cyst right maxillary antrum, similar to prior.  Remaining visualized paranasal sinuses and mastoid air cells are clear.  The calvarium appears intact.  .                               CT Abdomen Pelvis With Contrast (Final result)  Result time 09/07/23 13:53:30    Final result by Yahir Pedroza Jr., MD (09/07/23 13:53:30)                 Impression:      3 mm nonobstructing left lower pole renal stone.    Hypodensities in the liver and kidneys too small to characterize.    IVC appears patent.  IVC filter with minimal penetration of the struts.  No convincing thrombus nor mass identified.    Hepatomegaly.      Electronically signed by: Yahir Pedroza MD  Date:    09/07/2023  Time:    13:53             Narrative:    EXAMINATION:  CT ABDOMEN PELVIS WITH CONTRAST    CLINICAL HISTORY:  Abdominal pain, acute, nonlocalized;    TECHNIQUE:  Low dose axial images, sagittal and coronal reformations were obtained from the lung bases to the pubic symphysis following the IV administration of 100 mL of Omnipaque 350 .  Oral contrast was not given.    COMPARISON:  None.    FINDINGS:  In the chest, no significant pleural or pericardial fluid.  Lungs are well aerated.  No confluent consolidation or mass lesion.    In the abdomen, liver is enlarged.  Few mm hypodensities in the hepatic dome too small to characterize.  No intrahepatic biliary dilatation.  Gallbladder unremarkable.  Pancreas and spleen are normal.  No adrenal masses.  Hypodensity in the kidney too small to characterize.  3 mm nonobstructing left lower pole calculus.  No enhancing renal masses.  No hydronephrosis.    Aorta tapers normally.  IVC filter noted.  No significant para-aortic adenopathy.  IVC and bilateral iliac veins are enhanced and appear patent.  No convincing filling defect to suggest thrombus.  No convincing para-aortic nor  pelvic adenopathy.  Contrast in the bladder.  Prostate not enlarged for age.    Evaluation of the bowel demonstrates scattered stool and bowel gas.  No focal dilatation.  Presumed ingested material in the stomach.  Appendix is normal.  Nonenlarged mesenteric nodes present.    Bones are well mineralized.  Alignment is satisfactory.  No convincing lytic nor blastic lesion.                               MRI Lumbar Spine W WO Cont (Final result)  Result time 09/07/23 10:48:42    Final result by Tatum Dorsey MD (09/07/23 10:48:42)                 Impression:      Postoperative changes of L4-L5 micro discectomy. There is mild adjacent endplate edema suggestive of active degenerative change.  No recurrent disc herniation. No significant canal stenosis.  There is mild-moderate bilateral foraminal narrowing at L4-5.      Electronically signed by: Tatum Dorsey MD  Date:    09/07/2023  Time:    10:48             Narrative:    EXAMINATION:  MRI LUMBAR SPINE W WO CONTRAST    CLINICAL HISTORY:  Low back pain, prior surgery, new symptoms;    TECHNIQUE:  Multiplanar, multisequence images of the Lumbar Spine were obtained with and without the use of intravenous contrast. 10 of IV Gadavist was injected.    COMPARISON:  08/12/2021 MRI lumbar    FINDINGS:  Prior L4-5 micro discectomy (08/26/2021.    Alignment: Normal lumbar lordosis is preserved.    Vertebrae:  Body heights are well maintained. No evidence for acute fracture.  There is mild adjacent endplate edema at L4-5 suggestive of active degenerative change.    Discs:  Mild disc desiccation L3-4 through L5-S1.    Cord:  Visualized conus and lumbar spinal nerve roots demonstrate normal signal.    T12-L1 through L2-3: There is no focal disc herniation.  No significant spinal canal stenosis.  No significant neural foraminal narrowing.    L3-4:  Mild disc bulge, no significant spinal canal stenosis.  No significant neural foraminal narrowing.  The facet joints appear  within normal limits.    L4-5:  Previously seen disc herniation is no longer seen.  No recurrent disc herniation.  Very mild foraminal bulge bilaterally.  There is no canal stenosis.  There is mild-moderate foraminal narrowing.    L5-S1:  Mild diffuse disc bulge.  No significant spinal canal stenosis.  No significant neural foraminal narrowing.  Mild facet joint degenerative change.    No enhancing intradural mass or abnormal leptomeningeal enhancement. No intramedullary cord enhancement.  The conus terminates at L1-L2 level.    The upper sacrum and upper sacroiliac joints appear normal.  The paravertebral and paraspinal soft tissues appear normal.                    Final result by Tatum Dorsey MD (09/07/23 10:48:42)                 Impression:      Postoperative changes of L4-L5 micro discectomy. There is mild adjacent endplate edema suggestive of active degenerative change.  No recurrent disc herniation. No significant canal stenosis.  There is mild-moderate bilateral foraminal narrowing at L4-5.      Electronically signed by: Tatum Dorsey MD  Date:    09/07/2023  Time:    10:48             Narrative:    EXAMINATION:  MRI LUMBAR SPINE W WO CONTRAST    CLINICAL HISTORY:  Low back pain, prior surgery, new symptoms;    TECHNIQUE:  Multiplanar, multisequence images of the Lumbar Spine were obtained with and without the use of intravenous contrast. 10 of IV Gadavist was injected.    COMPARISON:  08/12/2021 MRI lumbar    FINDINGS:  Prior L4-5 micro discectomy (08/26/2021.    Alignment: Normal lumbar lordosis is preserved.    Vertebrae:  Body heights are well maintained. No evidence for acute fracture.  There is mild adjacent endplate edema at L4-5 suggestive of active degenerative change.    Discs:  Mild disc desiccation L3-4 through L5-S1.    Cord:  Visualized conus and lumbar spinal nerve roots demonstrate normal signal.    T12-L1 through L2-3: There is no focal disc herniation.  No significant  spinal canal stenosis.  No significant neural foraminal narrowing.    L3-4:  Mild disc bulge, no significant spinal canal stenosis.  No significant neural foraminal narrowing.  The facet joints appear within normal limits.    L4-5:  Previously seen disc herniation is no longer seen.  No recurrent disc herniation.  Very mild foraminal bulge bilaterally.  There is no canal stenosis.  There is mild-moderate foraminal narrowing.    L5-S1:  Mild diffuse disc bulge.  No significant spinal canal stenosis.  No significant neural foraminal narrowing.  Mild facet joint degenerative change.    No enhancing intradural mass or abnormal leptomeningeal enhancement. No intramedullary cord enhancement.  The conus terminates at L1-L2 level.    The upper sacrum and upper sacroiliac joints appear normal.  The paravertebral and paraspinal soft tissues appear normal.                    Final result by Tatum Dorsey MD (09/07/23 10:48:42)                 Impression:      Postoperative changes of L4-L5 micro discectomy. There is mild adjacent endplate edema suggestive of active degenerative change.  No recurrent disc herniation. No significant canal stenosis.  There is mild-moderate bilateral foraminal narrowing at L4-5.      Electronically signed by: Tatum Dorsey MD  Date:    09/07/2023  Time:    10:48             Narrative:    EXAMINATION:  MRI LUMBAR SPINE W WO CONTRAST    CLINICAL HISTORY:  Low back pain, prior surgery, new symptoms;    TECHNIQUE:  Multiplanar, multisequence images of the Lumbar Spine were obtained with and without the use of intravenous contrast. 10 of IV Gadavist was injected.    COMPARISON:  08/12/2021 MRI lumbar    FINDINGS:  Prior L4-5 micro discectomy (08/26/2021.    Alignment: Normal lumbar lordosis is preserved.    Vertebrae:  Body heights are well maintained. No evidence for acute fracture.  There is mild adjacent endplate edema at L4-5 suggestive of active degenerative change.    Discs:  Mild  disc desiccation L3-4 through L5-S1.    Cord:  Visualized conus and lumbar spinal nerve roots demonstrate normal signal.    T12-L1 through L2-3: There is no focal disc herniation.  No significant spinal canal stenosis.  No significant neural foraminal narrowing.    L3-4:  Mild disc bulge, no significant spinal canal stenosis.  No significant neural foraminal narrowing.  The facet joints appear within normal limits.    L4-5:  Previously seen disc herniation is no longer seen.  No recurrent disc herniation.  Very mild foraminal bulge bilaterally.  There is no canal stenosis.  There is mild-moderate foraminal narrowing.    L5-S1:  Mild diffuse disc bulge.  No significant spinal canal stenosis.  No significant neural foraminal narrowing.  Mild facet joint degenerative change.    No enhancing intradural mass or abnormal leptomeningeal enhancement. No intramedullary cord enhancement.  The conus terminates at L1-L2 level.    The upper sacrum and upper sacroiliac joints appear normal.  The paravertebral and paraspinal soft tissues appear normal.                                Pending Diagnostic Studies:     None         Medications:  Reconciled Home Medications:      Medication List      START taking these medications    gabapentin 300 MG capsule  Commonly known as: NEURONTIN  Take 1 capsule (300 mg total) by mouth 3 (three) times daily.     LIDOcaine 5 %  Commonly known as: LIDODERM  Place 1 patch onto the skin once daily. Remove & Discard patch within 12 hours for 10 days     methocarbamoL 750 MG Tab  Commonly known as: ROBAXIN  Take 1 tablet (750 mg total) by mouth 4 (four) times daily. for 10 days        CONTINUE taking these medications    aspirin 81 MG EC tablet  Commonly known as: ECOTRIN  Take 81 mg by mouth once daily.     oxyCODONE 20 mg Tab immediate release tablet  Commonly known as: ROXICODONE  Take 1 tablet (20 mg) by mouth four times daily as needed for chronic pain            Indwelling Lines/Drains at time  of discharge:   Lines/Drains/Airways     None                 Time spent on the discharge of patient: 36 minutes         Carolynn Kellogg PA-C  Department of Hospital Medicine  [facility] 11H

## 2023-12-27 ENCOUNTER — HOSPITAL ENCOUNTER (EMERGENCY)
Facility: HOSPITAL | Age: 38
Discharge: HOME | End: 2023-12-27
Attending: SURGERY
Payer: MEDICAID

## 2023-12-27 VITALS
SYSTOLIC BLOOD PRESSURE: 185 MMHG | WEIGHT: 315 LBS | OXYGEN SATURATION: 98 % | TEMPERATURE: 96 F | RESPIRATION RATE: 18 BRPM | HEART RATE: 101 BPM | DIASTOLIC BLOOD PRESSURE: 85 MMHG | BODY MASS INDEX: 41.86 KG/M2

## 2023-12-27 DIAGNOSIS — F43.9 STRESS: Primary | ICD-10-CM

## 2023-12-27 DIAGNOSIS — S39.012A STRAIN OF LUMBAR REGION, INITIAL ENCOUNTER: ICD-10-CM

## 2023-12-27 DIAGNOSIS — M54.9 BACK PAIN: ICD-10-CM

## 2023-12-27 LAB
ALBUMIN SERPL BCP-MCNC: 3.9 G/DL (ref 3.5–5.2)
ALP SERPL-CCNC: 74 U/L (ref 55–135)
ALT SERPL W/O P-5'-P-CCNC: 22 U/L (ref 10–44)
ANION GAP SERPL CALC-SCNC: 10 MMOL/L (ref 8–16)
AST SERPL-CCNC: 16 U/L (ref 10–40)
BASOPHILS # BLD AUTO: 0.05 K/UL (ref 0–0.2)
BASOPHILS NFR BLD: 0.6 % (ref 0–1.9)
BILIRUB SERPL-MCNC: 0.3 MG/DL (ref 0.1–1)
BNP SERPL-MCNC: <10 PG/ML (ref 0–99)
BUN SERPL-MCNC: 16 MG/DL (ref 6–20)
CALCIUM SERPL-MCNC: 9.5 MG/DL (ref 8.7–10.5)
CHLORIDE SERPL-SCNC: 109 MMOL/L (ref 95–110)
CK SERPL-CCNC: 92 U/L (ref 20–200)
CO2 SERPL-SCNC: 21 MMOL/L (ref 23–29)
CREAT SERPL-MCNC: 1.1 MG/DL (ref 0.5–1.4)
DIFFERENTIAL METHOD: NORMAL
EOSINOPHIL # BLD AUTO: 0.3 K/UL (ref 0–0.5)
EOSINOPHIL NFR BLD: 3.7 % (ref 0–8)
ERYTHROCYTE [DISTWIDTH] IN BLOOD BY AUTOMATED COUNT: 12.5 % (ref 11.5–14.5)
EST. GFR  (NO RACE VARIABLE): >60 ML/MIN/1.73 M^2
GLUCOSE SERPL-MCNC: 135 MG/DL (ref 70–110)
HCT VFR BLD AUTO: 43.3 % (ref 40–54)
HGB BLD-MCNC: 14.7 G/DL (ref 14–18)
IMM GRANULOCYTES # BLD AUTO: 0.04 K/UL (ref 0–0.04)
IMM GRANULOCYTES NFR BLD AUTO: 0.5 % (ref 0–0.5)
LYMPHOCYTES # BLD AUTO: 2.2 K/UL (ref 1–4.8)
LYMPHOCYTES NFR BLD: 26.2 % (ref 18–48)
MCH RBC QN AUTO: 28.7 PG (ref 27–31)
MCHC RBC AUTO-ENTMCNC: 33.9 G/DL (ref 32–36)
MCV RBC AUTO: 84 FL (ref 82–98)
MONOCYTES # BLD AUTO: 0.6 K/UL (ref 0.3–1)
MONOCYTES NFR BLD: 6.5 % (ref 4–15)
NEUTROPHILS # BLD AUTO: 5.4 K/UL (ref 1.8–7.7)
NEUTROPHILS NFR BLD: 62.5 % (ref 38–73)
NRBC BLD-RTO: 0 /100 WBC
PLATELET # BLD AUTO: 195 K/UL (ref 150–450)
PMV BLD AUTO: 10.2 FL (ref 9.2–12.9)
POTASSIUM SERPL-SCNC: 4.1 MMOL/L (ref 3.5–5.1)
PROT SERPL-MCNC: 7 G/DL (ref 6–8.4)
RBC # BLD AUTO: 5.13 M/UL (ref 4.6–6.2)
SODIUM SERPL-SCNC: 140 MMOL/L (ref 136–145)
TROPONIN I SERPL DL<=0.01 NG/ML-MCNC: <0.006 NG/ML (ref 0–0.03)
WBC # BLD AUTO: 8.56 K/UL (ref 3.9–12.7)

## 2023-12-27 PROCEDURE — 83880 ASSAY OF NATRIURETIC PEPTIDE: CPT | Performed by: SURGERY

## 2023-12-27 PROCEDURE — 93005 ELECTROCARDIOGRAM TRACING: CPT

## 2023-12-27 PROCEDURE — 63600175 PHARM REV CODE 636 W HCPCS: Performed by: SURGERY

## 2023-12-27 PROCEDURE — 80053 COMPREHEN METABOLIC PANEL: CPT | Performed by: SURGERY

## 2023-12-27 PROCEDURE — 93010 EKG 12-LEAD: ICD-10-PCS | Mod: ,,, | Performed by: INTERNAL MEDICINE

## 2023-12-27 PROCEDURE — 93010 ELECTROCARDIOGRAM REPORT: CPT | Mod: ,,, | Performed by: INTERNAL MEDICINE

## 2023-12-27 PROCEDURE — 36415 COLL VENOUS BLD VENIPUNCTURE: CPT | Performed by: SURGERY

## 2023-12-27 PROCEDURE — 85025 COMPLETE CBC W/AUTO DIFF WBC: CPT | Performed by: SURGERY

## 2023-12-27 PROCEDURE — 84484 ASSAY OF TROPONIN QUANT: CPT | Performed by: SURGERY

## 2023-12-27 PROCEDURE — 99285 EMERGENCY DEPT VISIT HI MDM: CPT | Mod: 25

## 2023-12-27 PROCEDURE — 96372 THER/PROPH/DIAG INJ SC/IM: CPT | Performed by: SURGERY

## 2023-12-27 PROCEDURE — 82550 ASSAY OF CK (CPK): CPT | Performed by: SURGERY

## 2023-12-27 RX ORDER — ONDANSETRON 2 MG/ML
4 INJECTION INTRAMUSCULAR; INTRAVENOUS
Status: COMPLETED | OUTPATIENT
Start: 2023-12-27 | End: 2023-12-27

## 2023-12-27 RX ORDER — HYDROMORPHONE HYDROCHLORIDE 1 MG/ML
2 INJECTION, SOLUTION INTRAMUSCULAR; INTRAVENOUS; SUBCUTANEOUS
Status: COMPLETED | OUTPATIENT
Start: 2023-12-27 | End: 2023-12-27

## 2023-12-27 RX ORDER — PREDNISONE 20 MG/1
40 TABLET ORAL DAILY
Qty: 10 TABLET | Refills: 0 | Status: SHIPPED | OUTPATIENT
Start: 2023-12-27 | End: 2024-01-01

## 2023-12-27 RX ADMIN — ONDANSETRON 4 MG: 2 INJECTION INTRAMUSCULAR; INTRAVENOUS at 01:12

## 2023-12-27 RX ADMIN — HYDROMORPHONE HYDROCHLORIDE 2 MG: 1 INJECTION, SOLUTION INTRAMUSCULAR; INTRAVENOUS; SUBCUTANEOUS at 01:12

## 2023-12-27 NOTE — ED NOTES
Pt walked to the  with walker, requesting pain medication. MD notified via secure chat. No new orders at this time.

## 2023-12-27 NOTE — ED PROVIDER NOTES
"Encounter Date: 12/27/2023       History     Chief Complaint   Patient presents with    Back Pain     Hugh Sorenson III is a 38 y.o. male that presents with chronic low back pain issues  Patient states he was injured on the job on October 2022 with continued back pain since  Patient states the issues only getting worse with him fighting to get necessary surgery  He states his  are looking into the issue, he feels that he needs lumbar surgery  Patient on exam has a normal lumbar exam without any step-off deformity noted today  Patient has a normal neuro exam with no signs of cauda equina syndrome on ER eval  Patient states that he suffers from chronic low back pain, ambulates with a walker now    The history is provided by the patient.     Review of patient's allergies indicates:   Allergen Reactions    Flexeril [cyclobenzaprine]      Past Medical History:   Diagnosis Date    Anxiety     Back injury     "years ago     Past Surgical History:   Procedure Laterality Date    BACK SURGERY      HERNIA REPAIR      MINIMALLY INVASIVE SURGICAL REMOVAL OF INTERVERTEBRAL DISC OF SPINE USING MICROSCOPE N/A 08/26/2021    Procedure: DISCECTOMY, SPINE, MINIMALLY INVASIVE, USING MICROSCOPE; L4-5; 3hrs duration; C-arm; [facility]; Jelani flat with tabitha frame;;  Surgeon: Sage Burgess MD;  Location: [facility] MAIN OR;  Service: Neurosurgery;  Laterality: N/A;     Family History   Problem Relation Age of Onset    No Known Problems Mother     No Known Problems Father     No Known Problems Sister     No Known Problems Brother     No Known Problems Maternal Aunt     No Known Problems Maternal Uncle     No Known Problems Paternal Aunt     No Known Problems Paternal Uncle     No Known Problems Maternal Grandmother     No Known Problems Maternal Grandfather     No Known Problems Paternal Grandmother     No Known Problems Paternal Grandfather     Aneurysm Neg Hx     Cancer Neg Hx     Clotting disorder Neg Hx     Dementia Neg Hx  "    Diabetes Neg Hx     Fainting Neg Hx     Heart disease Neg Hx     Hyperlipidemia Neg Hx     Kidney disease Neg Hx     Liver disease Neg Hx     Migraines Neg Hx     Neuropathy Neg Hx     Obesity Neg Hx     Parkinsonism Neg Hx     Seizures Neg Hx     Stroke Neg Hx     Tremor Neg Hx      Social History     Tobacco Use    Smoking status: Every Day     Types: Vaping with nicotine    Smokeless tobacco: Never   Substance Use Topics    Alcohol use: No    Drug use: No     Review of Systems   Constitutional:  Negative for activity change, appetite change, fatigue, fever and unexpected weight change.   HENT:  Negative for congestion, ear pain, mouth sores, nosebleeds, rhinorrhea, sinus pressure, sneezing and sore throat.    Eyes:  Negative for pain, discharge, redness and itching.   Respiratory:  Negative for apnea, cough, chest tightness and shortness of breath.    Cardiovascular:  Negative for chest pain, palpitations and leg swelling.   Gastrointestinal:  Negative for abdominal distention, abdominal pain, anal bleeding, constipation, diarrhea, nausea and vomiting.   Endocrine: Negative.    Genitourinary:  Negative for dysuria, enuresis, flank pain and frequency.   Musculoskeletal:  Positive for back pain. Negative for arthralgias, neck pain and neck stiffness.   Skin:  Negative for color change and wound.   Allergic/Immunologic: Negative.    Neurological:  Negative for dizziness, tremors, syncope, facial asymmetry, speech difficulty, weakness, light-headedness, numbness and headaches.   Hematological:  Negative for adenopathy. Does not bruise/bleed easily.   Psychiatric/Behavioral:  Negative for agitation, behavioral problems, hallucinations, self-injury and suicidal ideas. The patient is not nervous/anxious.        Physical Exam     Initial Vitals [12/27/23 1124]   BP Pulse Resp Temp SpO2   (!) 185/85 101 20 96 °F (35.6 °C) 98 %      MAP       --         Physical Exam    Nursing note and vitals reviewed.  Constitutional:  Vital signs are normal. He appears well-developed and well-nourished. He is cooperative.   HENT:   Head: Normocephalic and atraumatic.   Mouth/Throat: Uvula is midline and mucous membranes are normal.   Eyes: Conjunctivae, EOM and lids are normal. Pupils are equal, round, and reactive to light.   Neck: Neck supple.   Normal range of motion.   Full passive range of motion without pain.     Cardiovascular:  Normal rate, regular rhythm, S1 normal, S2 normal, normal heart sounds, intact distal pulses and normal pulses.           Pulmonary/Chest: Effort normal and breath sounds normal.   Abdominal: Abdomen is soft and flat. Bowel sounds are normal.   Musculoskeletal:         General: Normal range of motion.      Cervical back: Full passive range of motion without pain, normal range of motion and neck supple.     Neurological: He is alert and oriented to person, place, and time. He has normal strength.   Skin: Skin is warm, dry and intact. Capillary refill takes less than 2 seconds.         ED Course   Procedures  Labs Reviewed   COMPREHENSIVE METABOLIC PANEL - Abnormal; Notable for the following components:       Result Value    CO2 21 (*)     Glucose 135 (*)     All other components within normal limits   CBC W/ AUTO DIFFERENTIAL   TROPONIN I   CK   B-TYPE NATRIURETIC PEPTIDE          Imaging Results              US Lower Extremity Veins Bilateral (Final result)  Result time 12/27/23 14:13:09      Final result by Avery Estevez III, MD (12/27/23 14:13:09)                   Impression:      Significant reduction in right lower extremity clot burden.  The only remaining clot is partially occlusive in the right popliteal.      Electronically signed by: Avery Estevez MD  Date:    12/27/2023  Time:    14:13               Narrative:    EXAMINATION:  US LOWER EXTREMITY VEINS BILATERAL    CLINICAL HISTORY:  Right-sided leg swelling with long history of DVT;    FINDINGS:  On the right there is a partially compressible  popliteal vein.  Remaining veins bilaterally are normal and waveform and compressibility.  No popliteal fossa mass, cyst, or aneurysm seen.  The compared to the prior study dated 02/24/2023 there is a significant reduction in clot burden.                                       CT Lumbar Spine Without Contrast (Final result)  Result time 12/27/23 12:52:17      Final result by Avery Estevez III, MD (12/27/23 12:52:17)                   Impression:      Mild degenerative changes as above.      Electronically signed by: Avery Estevez MD  Date:    12/27/2023  Time:    12:52               Narrative:    EXAMINATION:  CT LUMBAR SPINE WITHOUT CONTRAST    CLINICAL HISTORY:  Low back pain, symptoms persist with > 6wks conservative treatment;    FINDINGS:  Comparison is MRI lumbar spine dated 09/07/2023 and CT lumbar spine dated 07/27/2023.    There is an IVC filter.  Alignment is normal.  There is mild degenerative change throughout the L-spine lower T-spine.  No fracture dislocation bone destruction seen.  No pars defects are seen.  The posterior elements are intact.  The facets are well aligned.  No fracture, dislocation, or bone destruction seen.    At T10-T11, T11-T12, T12-L1, and L1-L2 the canal and neural foramina are patent.    L2-L3 there is mild DJD.  The canal and neural foramina are patent.    L3-L4 there is a mild disc bulge.  The canal and neural foramina are patent.    L4-L5 demonstrates a mild disc bulge.  The canal and neural foramina are patent.    L5-S1 the canal and neural foramina are patent.    No paraspinal masses are seen.                                       X-Ray Chest 1 View (Final result)  Result time 12/27/23 12:47:47      Final result by Avery Estevez III, MD (12/27/23 12:47:47)                   Impression:      No acute process seen.      Electronically signed by: Avery Estevez MD  Date:    12/27/2023  Time:    12:47               Narrative:    EXAMINATION:  XR CHEST 1 VIEW    CLINICAL  HISTORY:  Back pain;    FINDINGS:  Chest one view: Heart size is normal.  Lungs are clear.  The bones showed DJD.                                       Medications   HYDROmorphone injection 2 mg (2 mg Intramuscular Given 12/27/23 1325)   ondansetron injection 4 mg (4 mg Intramuscular Given 12/27/23 1325)     Medical Decision Making  38-year-old male presents with low back pain issues chronically, acutely this month  Differential includes lumbar sprain, strain, fracture, degenerative disc disease    Problems Addressed:  Back pain: complicated acute illness or injury  Strain of lumbar region, initial encounter: complicated acute illness or injury  Stress: complicated acute illness or injury    Amount and/or Complexity of Data Reviewed  Labs: ordered. Decision-making details documented in ED Course.  Radiology: ordered and independent interpretation performed.    ED Management & Risks of Complication, Morbidity, & Mortality:  Came with a myriad of complaints today including back pain & leg swelling  Ultrasound of the leg showing almost resolved chronic DVT on the right side  Patient's has a [place] filter, states he has not on any blood thinners now  Chest x-ray normal, lab work normal, EKG stable on ER assessment today  CT scan shows chronic degenerative changes to the lumbar spine per report  Normal motor exam, pain injection administered in the emergency room now  Patient sees neurosurgeon/orthopedic surgeons as well as pain management  The patient's had Dagmar prescription filled 2 weeks ago, continue medications  Patient states he is under lot of stress due to the fact he can no longer work  Follow-up with specialists & return with any change in symptoms on discharge    Clinical Impression:  Final diagnoses:  [M54.9] Back pain  [S39.012A] Strain of lumbar region, initial encounter  [F43.9] Stress (Primary)          ED Disposition Condition    Discharge Stable          ED Prescriptions       Medication Sig  Dispense Start Date End Date Auth. Provider    predniSONE (DELTASONE) 20 MG tablet Take 2 tablets (40 mg total) by mouth once daily. for 5 days 10 tablet 12/27/2023 1/1/2024 Karri Antoine MD          Follow-up Information       Follow up With Specialties Details Why Contact Info    Marco Andujar MD Family Medicine Go in 2 days  [address] [id number]  [phone]               Karri Antoine MD  12/27/23 [id number]

## 2023-12-27 NOTE — ED TRIAGE NOTES
38 y.o. male presents to ER Room/bed info not found   Chief Complaint   Patient presents with    Back Pain   .   C/o lower back pain and leg swelling